# Patient Record
Sex: FEMALE | Race: ASIAN | Employment: UNEMPLOYED | URBAN - METROPOLITAN AREA
[De-identification: names, ages, dates, MRNs, and addresses within clinical notes are randomized per-mention and may not be internally consistent; named-entity substitution may affect disease eponyms.]

---

## 2017-01-13 ENCOUNTER — HOSPITAL ENCOUNTER (OUTPATIENT)
Dept: MAMMOGRAPHY | Facility: HOSPITAL | Age: 37
Discharge: HOME | End: 2017-01-13
Attending: INTERNAL MEDICINE
Payer: COMMERCIAL

## 2017-01-13 DIAGNOSIS — Z12.31 SCREENING MAMMOGRAM, ENCOUNTER FOR: ICD-10-CM

## 2017-01-13 PROCEDURE — 77067 SCR MAMMO BI INCL CAD: CPT

## 2017-01-20 ENCOUNTER — HOSPITAL ENCOUNTER (OUTPATIENT)
Dept: MAMMOGRAPHY | Facility: HOSPITAL | Age: 37
Discharge: HOME | End: 2017-01-20
Attending: INTERNAL MEDICINE
Payer: COMMERCIAL

## 2017-01-20 DIAGNOSIS — R92.2 INCONCLUSIVE MAMMOGRAM: ICD-10-CM

## 2017-01-20 PROCEDURE — 76642 ULTRASOUND BREAST LIMITED: CPT

## 2017-11-13 ENCOUNTER — OFFICE VISIT (OUTPATIENT)
Dept: FAMILY MEDICINE CLINIC | Facility: CLINIC | Age: 37
End: 2017-11-13

## 2017-11-13 VITALS
RESPIRATION RATE: 18 BRPM | SYSTOLIC BLOOD PRESSURE: 110 MMHG | WEIGHT: 178.81 LBS | BODY MASS INDEX: 31 KG/M2 | OXYGEN SATURATION: 97 % | TEMPERATURE: 99 F | HEART RATE: 96 BPM | DIASTOLIC BLOOD PRESSURE: 64 MMHG

## 2017-11-13 DIAGNOSIS — H10.33 ACUTE BACTERIAL CONJUNCTIVITIS OF BOTH EYES: ICD-10-CM

## 2017-11-13 DIAGNOSIS — J00 ACUTE NASOPHARYNGITIS: Primary | ICD-10-CM

## 2017-11-13 DIAGNOSIS — J02.9 SORE THROAT: ICD-10-CM

## 2017-11-13 PROCEDURE — 99202 OFFICE O/P NEW SF 15 MIN: CPT | Performed by: FAMILY MEDICINE

## 2017-11-13 PROCEDURE — 87081 CULTURE SCREEN ONLY: CPT | Performed by: FAMILY MEDICINE

## 2017-11-13 PROCEDURE — 87880 STREP A ASSAY W/OPTIC: CPT | Performed by: FAMILY MEDICINE

## 2017-11-13 RX ORDER — PREDNISONE 1 MG/1
1 TABLET ORAL
COMMUNITY
End: 2017-11-30 | Stop reason: ALTCHOICE

## 2017-11-13 RX ORDER — OFLOXACIN 3 MG/ML
SOLUTION/ DROPS OPHTHALMIC
Qty: 10 ML | Refills: 0 | Status: SHIPPED | OUTPATIENT
Start: 2017-11-13 | End: 2017-11-30 | Stop reason: ALTCHOICE

## 2017-11-13 RX ORDER — FLUTICASONE PROPIONATE 50 MCG
2 SPRAY, SUSPENSION (ML) NASAL DAILY
Qty: 1 BOTTLE | Refills: 1 | Status: SHIPPED | OUTPATIENT
Start: 2017-11-13 | End: 2018-01-11

## 2017-11-13 NOTE — PATIENT INSTRUCTIONS
· if you break out in a rash, return to the [facility] immediately for reeval, call your ophthamologist or go to ER, you may have developed shingles, which can cause blindness if left untreated. · Apply warm compresses twice daily.    ·  F Colds are caused by viruses. They can't be cured with antibiotics. However, you can ease symptoms and support your body's efforts to heal itself.   No matter which symptoms you have, be sure to:  · Drink plenty of fluids (water or clear soup)  · Stop smokin When you first notice symptoms, ask your healthcare provider if antiviral medicines are appropriate. Antibiotics should not be taken for colds or flu.  Also, call your healthcare provider if you have any of the following symptoms or if you aren't feeling be The tonsils and pharynx can become inflamed due to a cold or flu virus. Postnasal drip (excess mucus draining from the nasal cavity) can irritate the throat. It can also make the throat or tonsils more likely to be infected by bacteria.  Severe, untreated t Treatment depends on many factors. What is the likely cause? Is the problem recent? Does it keep coming back? In many cases, the best thing to do is to treat the symptoms, rest, and let the problem heal itself.  Antibiotics may help clear up some bacterial In some cases, tonsils need to be removed. This is often done as outpatient (same-day) surgery. Your healthcare provider may advise removing the tonsils in cases of:  · Several severe bouts of tonsillitis in a year.  “Severe” episodes include those that geovanna · Apply a warm compress (towel soaked in warm water) to the affected eye 3 to 4 times a day. Do this just before applying medicine to the eye. · Use a warm, wet cloth to wipe away crusting of the eyelids in the morning.  This is caused by mucus drainage du

## 2017-11-13 NOTE — PROGRESS NOTES
CHIEF COMPLAINT: Patient presents with:  Sore Throat: x5 days    HPI:     Susan Esteves is a 43year old female presents for evaluation of sore throat ×5 days with associated symptoms of nasal congestion and dry cough and postnasal drip.   Ivon Mcnamara the HPI.     PHYSICAL EXAM:   /64 (BP Location: Right arm, Patient Position: Sitting, Cuff Size: adult)   Pulse 96   Temp 98.6 °F (37 °C) (Oral)   Resp 18   Wt 178 lb 12.8 oz   LMP 10/29/2017 (Exact Date)   SpO2 97%   BMI 30.69 kg/m²   Vital signs rev typically  Finish eyedrops her symptoms will return  If breaks out in rash around eyes and must be evaluated immediately i.e. concern is herpes zoster  No visual acuity and does not wear glasses  No eye makeup and throat old eye makeup.   Detailed instructi

## 2017-11-16 ENCOUNTER — TELEPHONE (OUTPATIENT)
Dept: FAMILY MEDICINE CLINIC | Facility: CLINIC | Age: 37
End: 2017-11-16

## 2017-11-16 NOTE — TELEPHONE ENCOUNTER
Spoke with pt, reviewed results and recommendations, pt has continued to improve. P will follow up if symptoms worsen or fail to improve.     No Beta Hemolytic Strep Isolated

## 2017-11-30 ENCOUNTER — OFFICE VISIT (OUTPATIENT)
Dept: FAMILY MEDICINE CLINIC | Facility: CLINIC | Age: 37
End: 2017-11-30

## 2017-11-30 VITALS
TEMPERATURE: 98 F | OXYGEN SATURATION: 100 % | RESPIRATION RATE: 18 BRPM | SYSTOLIC BLOOD PRESSURE: 102 MMHG | DIASTOLIC BLOOD PRESSURE: 60 MMHG | HEART RATE: 106 BPM | WEIGHT: 181 LBS | BODY MASS INDEX: 31 KG/M2

## 2017-11-30 DIAGNOSIS — J01.90 ACUTE SINUSITIS WITH SYMPTOMS GREATER THAN 10 DAYS: Primary | ICD-10-CM

## 2017-11-30 DIAGNOSIS — J20.9 ACUTE BRONCHITIS, UNSPECIFIED ORGANISM: ICD-10-CM

## 2017-11-30 PROCEDURE — 99213 OFFICE O/P EST LOW 20 MIN: CPT | Performed by: NURSE PRACTITIONER

## 2017-11-30 RX ORDER — DOXYCYCLINE HYCLATE 100 MG/1
100 CAPSULE ORAL 2 TIMES DAILY
Qty: 20 CAPSULE | Refills: 0 | Status: SHIPPED | OUTPATIENT
Start: 2017-11-30 | End: 2018-01-11

## 2017-11-30 RX ORDER — CODEINE PHOSPHATE AND GUAIFENESIN 10; 100 MG/5ML; MG/5ML
SOLUTION ORAL
Qty: 180 ML | Refills: 0 | Status: SHIPPED | OUTPATIENT
Start: 2017-11-30 | End: 2018-01-11

## 2018-01-11 ENCOUNTER — OFFICE VISIT (OUTPATIENT)
Dept: FAMILY MEDICINE CLINIC | Facility: CLINIC | Age: 38
End: 2018-01-11

## 2018-01-11 ENCOUNTER — TELEPHONE (OUTPATIENT)
Dept: FAMILY MEDICINE CLINIC | Facility: CLINIC | Age: 38
End: 2018-01-11

## 2018-01-11 VITALS
WEIGHT: 177.63 LBS | RESPIRATION RATE: 18 BRPM | TEMPERATURE: 98 F | SYSTOLIC BLOOD PRESSURE: 94 MMHG | HEART RATE: 76 BPM | DIASTOLIC BLOOD PRESSURE: 60 MMHG | BODY MASS INDEX: 31.08 KG/M2 | OXYGEN SATURATION: 99 % | HEIGHT: 63.25 IN

## 2018-01-11 DIAGNOSIS — M19.042 PRIMARY OSTEOARTHRITIS OF BOTH HANDS: ICD-10-CM

## 2018-01-11 DIAGNOSIS — Z23 NEED FOR VACCINATION: ICD-10-CM

## 2018-01-11 DIAGNOSIS — Z00.00 ANNUAL PHYSICAL EXAM: Primary | ICD-10-CM

## 2018-01-11 DIAGNOSIS — Z13.0 SCREENING FOR ENDOCRINE, NUTRITIONAL, METABOLIC AND IMMUNITY DISORDER: ICD-10-CM

## 2018-01-11 DIAGNOSIS — Z13.0 SCREENING FOR IRON DEFICIENCY ANEMIA: ICD-10-CM

## 2018-01-11 DIAGNOSIS — M19.041 PRIMARY OSTEOARTHRITIS OF BOTH HANDS: ICD-10-CM

## 2018-01-11 DIAGNOSIS — Z13.21 SCREENING FOR ENDOCRINE, NUTRITIONAL, METABOLIC AND IMMUNITY DISORDER: ICD-10-CM

## 2018-01-11 DIAGNOSIS — Z13.29 SCREENING FOR ENDOCRINE, NUTRITIONAL, METABOLIC AND IMMUNITY DISORDER: ICD-10-CM

## 2018-01-11 DIAGNOSIS — Z13.6 SCREENING FOR HEART DISEASE: ICD-10-CM

## 2018-01-11 DIAGNOSIS — Z13.228 SCREENING FOR ENDOCRINE, NUTRITIONAL, METABOLIC AND IMMUNITY DISORDER: ICD-10-CM

## 2018-01-11 LAB
25-HYDROXYVITAMIN D (TOTAL): 12 NG/ML (ref 30–100)
ALBUMIN SERPL-MCNC: 3.8 G/DL (ref 3.5–4.8)
ALP LIVER SERPL-CCNC: 54 U/L (ref 37–98)
ALT SERPL-CCNC: 43 U/L (ref 14–54)
AST SERPL-CCNC: 24 U/L (ref 15–41)
BASOPHILS # BLD AUTO: 0.03 X10(3) UL (ref 0–0.1)
BASOPHILS NFR BLD AUTO: 0.4 %
BILIRUB SERPL-MCNC: 0.6 MG/DL (ref 0.1–2)
BUN BLD-MCNC: 9 MG/DL (ref 8–20)
CALCIUM BLD-MCNC: 9.2 MG/DL (ref 8.3–10.3)
CHLORIDE: 105 MMOL/L (ref 101–111)
CHOLEST SMN-MCNC: 195 MG/DL (ref ?–200)
CO2: 28 MMOL/L (ref 22–32)
CREAT BLD-MCNC: 0.76 MG/DL (ref 0.55–1.02)
EOSINOPHIL # BLD AUTO: 0.12 X10(3) UL (ref 0–0.3)
EOSINOPHIL NFR BLD AUTO: 1.6 %
ERYTHROCYTE [DISTWIDTH] IN BLOOD BY AUTOMATED COUNT: 13.2 % (ref 11.5–16)
GLUCOSE BLD-MCNC: 76 MG/DL (ref 70–99)
HCT VFR BLD AUTO: 39.4 % (ref 34–50)
HDLC SERPL-MCNC: 45 MG/DL (ref 45–?)
HDLC SERPL: 4.33 {RATIO} (ref ?–4.44)
HGB BLD-MCNC: 12.6 G/DL (ref 12–16)
IMMATURE GRANULOCYTE COUNT: 0.02 X10(3) UL (ref 0–1)
IMMATURE GRANULOCYTE RATIO %: 0.3 %
LDLC SERPL CALC-MCNC: 132 MG/DL (ref ?–130)
LYMPHOCYTES # BLD AUTO: 2.66 X10(3) UL (ref 0.9–4)
LYMPHOCYTES NFR BLD AUTO: 36.4 %
M PROTEIN MFR SERPL ELPH: 7.8 G/DL (ref 6.1–8.3)
MCH RBC QN AUTO: 28.5 PG (ref 27–33.2)
MCHC RBC AUTO-ENTMCNC: 32 G/DL (ref 31–37)
MCV RBC AUTO: 89.1 FL (ref 81–100)
MONOCYTES # BLD AUTO: 0.56 X10(3) UL (ref 0.1–0.6)
MONOCYTES NFR BLD AUTO: 7.7 %
NEUTROPHIL ABS PRELIM: 3.92 X10 (3) UL (ref 1.3–6.7)
NEUTROPHILS # BLD AUTO: 3.92 X10(3) UL (ref 1.3–6.7)
NEUTROPHILS NFR BLD AUTO: 53.6 %
NONHDLC SERPL-MCNC: 150 MG/DL (ref ?–130)
PLATELET # BLD AUTO: 273 10(3)UL (ref 150–450)
POTASSIUM SERPL-SCNC: 4 MMOL/L (ref 3.6–5.1)
RBC # BLD AUTO: 4.42 X10(6)UL (ref 3.8–5.1)
RED CELL DISTRIBUTION WIDTH-SD: 42.7 FL (ref 35.1–46.3)
RUBELLA IGG QUANTITATIVE: 56.9 IU/ML (ref 10–?)
RUBV IGG SER QL: POSITIVE
SODIUM SERPL-SCNC: 137 MMOL/L (ref 136–144)
TRIGL SERPL-MCNC: 92 MG/DL (ref ?–150)
TSI SER-ACNC: 2.44 MIU/ML (ref 0.35–5.5)
VLDLC SERPL CALC-MCNC: 18 MG/DL (ref 5–40)
WBC # BLD AUTO: 7.3 X10(3) UL (ref 4–13)

## 2018-01-11 PROCEDURE — 86735 MUMPS ANTIBODY: CPT | Performed by: FAMILY MEDICINE

## 2018-01-11 PROCEDURE — 90471 IMMUNIZATION ADMIN: CPT | Performed by: FAMILY MEDICINE

## 2018-01-11 PROCEDURE — 99395 PREV VISIT EST AGE 18-39: CPT | Performed by: FAMILY MEDICINE

## 2018-01-11 PROCEDURE — 86765 RUBEOLA ANTIBODY: CPT | Performed by: FAMILY MEDICINE

## 2018-01-11 PROCEDURE — 82306 VITAMIN D 25 HYDROXY: CPT | Performed by: FAMILY MEDICINE

## 2018-01-11 PROCEDURE — 80050 GENERAL HEALTH PANEL: CPT | Performed by: FAMILY MEDICINE

## 2018-01-11 PROCEDURE — 80061 LIPID PANEL: CPT | Performed by: FAMILY MEDICINE

## 2018-01-11 PROCEDURE — 86787 VARICELLA-ZOSTER ANTIBODY: CPT | Performed by: FAMILY MEDICINE

## 2018-01-11 PROCEDURE — 36415 COLL VENOUS BLD VENIPUNCTURE: CPT | Performed by: FAMILY MEDICINE

## 2018-01-11 PROCEDURE — 86762 RUBELLA ANTIBODY: CPT | Performed by: FAMILY MEDICINE

## 2018-01-11 PROCEDURE — 90715 TDAP VACCINE 7 YRS/> IM: CPT | Performed by: FAMILY MEDICINE

## 2018-01-11 NOTE — PROGRESS NOTES
Patient came in for fasting labs. Patient drawn out of right AC x 1 attempt. Gold, light green, and lavender tubes drawn.

## 2018-01-11 NOTE — TELEPHONE ENCOUNTER
Patient signed medical records authorization form for the below Facility to disclose health information to [facility]:     Facility / Provider Name: Dr. Amparo Orozco Address:  Facility Phone: [address] Fax: [address] [facility]

## 2018-01-11 NOTE — PATIENT INSTRUCTIONS
Perform labs fasting 8 hours with water or black coffee or or black tea diet  soda only prior to exam.    -Encourage healthy diet of whole food and avoid processed food and sugary drinks and sodas.   Diet should include lean meats and vegetables including 5 Vitamin D comes in several forms. When ultraviolet light, such as sunlight, hits your skin, it creates vitamin D3. D2 is used to fortify dairy foods. Both of these are further processed by your liver and kidneys into a form your body can use.  Most tests fo Children and adults need more than 30 nanograms per milliliter (ng/ml) of vitamin D. The optimal level of 25(OH)D is usually between 30 and 60 ng/mL. Recommended daily amounts range from 400 to 800 international units (IU) per day based on your age.   Level Your body needs calcium to build and repair bones. But it can't make calcium on its own. That's why it's important to eat calcium-rich foods. Some foods are naturally rich in calcium. Others have calcium added (fortified).  It's best to get calcium from the Osteoporosis is a disease that weakens the bones. Weakened bones are more likely to fracture (break). Osteoporosis affects men and women, but postmenopausal women are most at risk.  To help prevent osteoporosis, you need to exercise and nourish your bones t ©2007 [employer] [address] [facility], [address]. All Rights reserved. This information is not intended as a substitute for professional medical care. Always follow your healthcare provider’s instructions.         Preventing Osteop If you have osteoporosis, exercise is vital for your health. It can prevent bone fractures and spine changes. It will slow bone loss. Exercise will strengthen your body. It can also be fun.  A variety of exercises is best. See below for exercises that can h

## 2018-01-11 NOTE — PROGRESS NOTES
REASON FOR VISIT:    Senait Paiz is a 42year old female who presents for an [address]. Had workup with rheumatologist, Byron Nolasco MD, per patient and diagnosed with osteoarthritis of the hands and feet.   Does not take med No results found for: TSH    Lab Results  Component Value Date   BUN 15 11/23/2011   CREATSERUM 1.0 11/23/2011       General Health     How would you describe your current health state?: Good    Type of Diet: Vegetarian    How do you maintain positive ment Cholesterol Screening Recommended screening varies with age, risk and gender No results found for: LDL, LDLC    Diabetes Screening  if history of high blood pressure or other  risk factors No results found for: A1C  GLUCOSE (mg/dL)   Date Value   11/23/201 rheumatory   • Patient denies medical problems    • Tubal pregnancy       Past Surgical History:  No date: TUBAL LIGATION   Family History   Problem Relation Age of Onset   • No Known Problems Mother    • No Known Problems Father    • Hypertension Materna LUNGS: clear to auscultation  CARDIO: RRR without murmur  GI: good BS's, no masses, HSM or tenderness  : deferred- has appt with Gyn next week  RECTAL: declined  MUSCULOSKELETAL: back is not tender, FROM of the back  EXTREMITIES: no cyanosis, clubbing or -- TETANUS, DIPHTHERIA TOXOIDS AND ACELLULAR PERTUSIS VACCINE (TDAP), >7 YEARS, IM USE  - INFLUENZA VIRUS VACCINE, QUAD, PRESERVATIVE FREE, 0.5 ML    6. Primary osteoarthritis of both hands  Does not take any medication medications for pain.   No joint defo

## 2018-01-12 LAB
MEV IGG SER IA-ACNC: POSITIVE
MUV IGG SER IA-ACNC: POSITIVE
VZV IGG SER IA-ACNC: NEGATIVE

## 2018-01-17 ENCOUNTER — TELEPHONE (OUTPATIENT)
Dept: FAMILY MEDICINE CLINIC | Facility: CLINIC | Age: 38
End: 2018-01-17

## 2018-01-17 ENCOUNTER — OFFICE VISIT (OUTPATIENT)
Dept: FAMILY MEDICINE CLINIC | Facility: CLINIC | Age: 38
End: 2018-01-17

## 2018-01-17 VITALS
TEMPERATURE: 102 F | HEART RATE: 112 BPM | SYSTOLIC BLOOD PRESSURE: 100 MMHG | OXYGEN SATURATION: 99 % | DIASTOLIC BLOOD PRESSURE: 70 MMHG | RESPIRATION RATE: 16 BRPM

## 2018-01-17 DIAGNOSIS — J11.1 INFLUENZA: Primary | ICD-10-CM

## 2018-01-17 PROCEDURE — 99213 OFFICE O/P EST LOW 20 MIN: CPT | Performed by: PHYSICIAN ASSISTANT

## 2018-01-17 RX ORDER — BENZONATATE 200 MG/1
200 CAPSULE ORAL 3 TIMES DAILY PRN
Qty: 30 CAPSULE | Refills: 0 | Status: SHIPPED | OUTPATIENT
Start: 2018-01-17 | End: 2018-02-12 | Stop reason: ALTCHOICE

## 2018-01-17 RX ORDER — FLUTICASONE PROPIONATE 50 MCG
2 SPRAY, SUSPENSION (ML) NASAL DAILY
Qty: 1 INHALER | Refills: 0 | Status: SHIPPED | OUTPATIENT
Start: 2018-01-17 | End: 2018-02-12 | Stop reason: ALTCHOICE

## 2018-01-17 RX ORDER — OSELTAMIVIR PHOSPHATE 75 MG/1
75 CAPSULE ORAL 2 TIMES DAILY
Qty: 10 CAPSULE | Refills: 0 | Status: SHIPPED | OUTPATIENT
Start: 2018-01-17 | End: 2018-02-12 | Stop reason: ALTCHOICE

## 2018-01-17 NOTE — TELEPHONE ENCOUNTER
SPoke with pt, appointment scheduled    Future Appointments  Date Time Provider Peg Hagen   2/12/2018 6:40 PM Carlos Duckworth, DO [facility] [place]

## 2018-01-17 NOTE — PROGRESS NOTES
CHIEF COMPLAINT:   Patient presents with:  URI: cough, sore throat.  fever 103      HPI:      Helene Weiss is a 42year old female who presents complaining of flu-like symptoms of fever to 103, malaise, fatigue, chills, myalgias, congestion, sore palpation of  sinuses  EYES: sclera non icteric, conjunctiva clear  EARS: TM's non erythematous  NOSE: Nasal mucosa congested, clear nasal discharge   THROAT: Posterior pharynx is mildy erythematous, moderate PND, no exudates.      NECK: Supple, non-tender,

## 2018-01-17 NOTE — TELEPHONE ENCOUNTER
LMOM to return call to the office. Provided pt office phone [phone] along with office hours given.     Notes Recorded by Bryon Malin DO on 1/15/2018 at 10:06 PM CST  Low vitamin D and low lipid panel and not immune to varicella.  Needs vaccinatio

## 2018-02-12 ENCOUNTER — OFFICE VISIT (OUTPATIENT)
Dept: FAMILY MEDICINE CLINIC | Facility: CLINIC | Age: 38
End: 2018-02-12

## 2018-02-12 VITALS
SYSTOLIC BLOOD PRESSURE: 108 MMHG | HEART RATE: 85 BPM | RESPIRATION RATE: 18 BRPM | DIASTOLIC BLOOD PRESSURE: 68 MMHG | OXYGEN SATURATION: 98 % | TEMPERATURE: 98 F

## 2018-02-12 DIAGNOSIS — E78.5 DYSLIPIDEMIA (HIGH LDL; LOW HDL): ICD-10-CM

## 2018-02-12 DIAGNOSIS — E55.9 VITAMIN D DEFICIENCY: ICD-10-CM

## 2018-02-12 DIAGNOSIS — Z23 NEED FOR VACCINATION: Primary | ICD-10-CM

## 2018-02-12 PROCEDURE — 90716 VAR VACCINE LIVE SUBQ: CPT | Performed by: FAMILY MEDICINE

## 2018-02-12 PROCEDURE — 90471 IMMUNIZATION ADMIN: CPT | Performed by: FAMILY MEDICINE

## 2018-02-12 PROCEDURE — 99214 OFFICE O/P EST MOD 30 MIN: CPT | Performed by: FAMILY MEDICINE

## 2018-02-12 RX ORDER — ERGOCALCIFEROL 1.25 MG/1
CAPSULE ORAL
Refills: 0 | COMMUNITY
Start: 2018-01-31 | End: 2019-07-10 | Stop reason: ALTCHOICE

## 2018-02-12 RX ORDER — CEPHALEXIN 500 MG/1
CAPSULE ORAL
Refills: 0 | COMMUNITY
Start: 2018-01-31 | End: 2018-02-12 | Stop reason: ALTCHOICE

## 2018-02-12 RX ORDER — BETAMETHASONE DIPROPIONATE 0.05 %
OINTMENT (GRAM) TOPICAL
Refills: 6 | COMMUNITY
Start: 2018-01-31 | End: 2019-07-10 | Stop reason: ALTCHOICE

## 2018-02-13 NOTE — PATIENT INSTRUCTIONS
Buy over-the-counter vitamin D3  2000 IUs take  1 tab daily after Rx 50,000.      · Increase omega 3's and alpha linoleic acid (ALA) in diet salmon 2x weekly, walnuts, almonds,flax seed, fatty fish, spinach, blake seeds, grass feed dairy-milk,eggs, marianne l · Having difficulty absorbing fat from your diet  · Having chronic kidney disease  · Have dark skin pigmentation  · Being a  baby  Vitamin D has many effects in the body.  You may need this test to help your provider diagnose or treat:  · Problems What might affect my test results? The amount of time you spend in the sunlight, your diet, and whether you take vitamin D in supplement form can affect your vitamin D levels.  Ask your healthcare provider if any health conditions you have or medicines you Talk to your pediatrician regarding the use of the varicella virus vaccine in children. While this drug may be prescribed for children as young as 13 months of age for selected conditions, precautions do apply.  The herpes zoster virus vaccine is not approv Keep appointments for follow-up (booster) doses of varicella virus vaccine as directed. It is important not to miss your dose. Call your doctor or health care professional if you are unable to keep an appointment.   Follow-up (booster) doses are not needed

## 2018-02-13 NOTE — PROGRESS NOTES
CHIEF COMPLAINT: Patient presents with: Follow - Up: labs    HPI:     Geoffrey Carvalho is a 42year old female presents for followup labs. Started Rx vit D 50,000 IU weekly by derm for eczema x 2 weeks - admits fatigue.  Eczema improving with vit D s reviewed. Appears stated age, well groomed. Physical Exam  GENERAL: well developed, well nourished,in no apparent distress  EYES; PERRLA, EOM-i B/L.  Sclera clear and non icteric bilateral  SKIN: no rashes,no suspicious lesions  HEENT: atraumatic, normoceph 01/11/2018 3.92    • Lymphocyte Absolute 01/11/2018 2.66    • Monocyte Absolute 01/11/2018 0.56    • Eosinophil Absolute 01/11/2018 0.12    • Basophil Absolute 01/11/2018 0.03    • Immature Granulocyte Abs* 01/11/2018 0.02    • Neutrophil % 01/11/2018 53. 6 Education: There are no barriers to learning. Medical education done. Outcome: Patient verbalizes understanding. Patient is notified to call with any questions, comp lications, allergies, or worsening or changing symptoms.   Patient is to call with any si

## 2018-02-23 ENCOUNTER — TELEPHONE (OUTPATIENT)
Dept: FAMILY MEDICINE CLINIC | Facility: CLINIC | Age: 38
End: 2018-02-23

## 2018-02-23 NOTE — TELEPHONE ENCOUNTER
Spoke to patient, patient states she was seen at [facility] in December was told she can be on 5 year pap plan.

## 2018-02-23 NOTE — TELEPHONE ENCOUNTER
Received medical records, last pap was 2015. Needs repeat as of 1/2018. lmom for patient. No future appointments.

## 2018-06-22 PROCEDURE — 83001 ASSAY OF GONADOTROPIN (FSH): CPT | Performed by: INTERNAL MEDICINE

## 2018-06-22 PROCEDURE — 83002 ASSAY OF GONADOTROPIN (LH): CPT | Performed by: INTERNAL MEDICINE

## 2018-06-22 PROCEDURE — 36415 COLL VENOUS BLD VENIPUNCTURE: CPT | Performed by: INTERNAL MEDICINE

## 2018-07-27 PROBLEM — H10.33 ACUTE BACTERIAL CONJUNCTIVITIS OF BOTH EYES: Status: RESOLVED | Noted: 2017-11-13 | Resolved: 2018-07-27

## 2018-07-27 PROBLEM — J00 ACUTE NASOPHARYNGITIS: Status: RESOLVED | Noted: 2017-11-13 | Resolved: 2018-07-27

## 2018-10-12 ENCOUNTER — OFFICE VISIT (OUTPATIENT)
Dept: FAMILY MEDICINE CLINIC | Facility: CLINIC | Age: 38
End: 2018-10-12
Payer: COMMERCIAL

## 2018-10-12 VITALS
TEMPERATURE: 98 F | OXYGEN SATURATION: 99 % | WEIGHT: 176 LBS | RESPIRATION RATE: 18 BRPM | HEART RATE: 98 BPM | SYSTOLIC BLOOD PRESSURE: 110 MMHG | BODY MASS INDEX: 30 KG/M2 | DIASTOLIC BLOOD PRESSURE: 64 MMHG

## 2018-10-12 DIAGNOSIS — M54.12 LEFT CERVICAL RADICULOPATHY: ICD-10-CM

## 2018-10-12 DIAGNOSIS — R00.2 PALPITATIONS: ICD-10-CM

## 2018-10-12 DIAGNOSIS — M54.2 CERVICALGIA: Primary | ICD-10-CM

## 2018-10-12 PROCEDURE — 99214 OFFICE O/P EST MOD 30 MIN: CPT | Performed by: FAMILY MEDICINE

## 2018-10-12 RX ORDER — IBUPROFEN 800 MG/1
800 TABLET ORAL 3 TIMES DAILY
Qty: 21 TABLET | Refills: 0 | Status: SHIPPED | OUTPATIENT
Start: 2018-10-12 | End: 2018-10-18

## 2018-10-12 RX ORDER — CYCLOBENZAPRINE HCL 10 MG
10 TABLET ORAL
COMMUNITY
Start: 2018-10-08 | End: 2018-10-12

## 2018-10-12 RX ORDER — IBUPROFEN 200 MG
200 TABLET ORAL
COMMUNITY
End: 2018-10-24 | Stop reason: ALTCHOICE

## 2018-10-12 RX ORDER — PREDNISONE 50 MG/1
50 TABLET ORAL
COMMUNITY
Start: 2018-10-08 | End: 2018-10-13

## 2018-10-12 RX ORDER — CHLORZOXAZONE 500 MG/1
500 TABLET ORAL NIGHTLY PRN
Qty: 30 TABLET | Refills: 0 | Status: SHIPPED | OUTPATIENT
Start: 2018-10-12 | End: 2019-07-10 | Stop reason: ALTCHOICE

## 2018-10-12 NOTE — PATIENT INSTRUCTIONS
Understanding Cervical Radiculopathy    Cervical radiculopathy is irritation or inflammation of a nerve root in the neck. It causes neck pain and other symptoms that may spread into the chest or down the arm.  To understand this condition, it helps to und In most cases, your healthcare provider will first try treatments that help relieve symptoms. These may include:  · Prescription or over-the-counter pain medicines. These help relieve pain and swelling. · Cold packs. These help reduce pain. · Resting.  Yunior Hargrove · Coronary artery disease  · High blood pressure  Non-heart-related causes:  · Certain medicines such as asthma inhalers and decongestants  · Some herbal supplements, energy drinks and pills, and weight loss pills  · Illegal stimulant drugs such as cocaine

## 2018-10-12 NOTE — PROGRESS NOTES
CHIEF COMPLAINT: Patient presents with:  Neck Pain: disc spur C5-C7  Shoulder Pain    HPI:     Carlos Landeros is a 42year old female presents for discuss left-sided neck pain from base of mid neck radiates into her upper arm symptoms worsening ove Social History: Social History    Tobacco Use      Smoking status: Never Smoker      Smokeless tobacco: Never Used    Alcohol use:  Yes      Alcohol/week: 1.2 oz      Types: 2 Standard drinks or equivalent per week    Drug use: No       Medications (Active Neck: supple. No adenopathy. No thyromegaly. Tenderness C2 through C6 midline extending over to the left paravertebral musculature with moderate spasm.   Normal rotation of the C-spine right and left almost 80° and full extensionand able to perform chin to Seven cervical vertebral bodies are seen. The cervical spine demonstrates normal curvature and  alignment. The vertebral body heights are preserved. There is no prevertebral soft tissue swelling. There is mild endplate spurring at O5-Y8 and C6-C7.  The ne Sig: Take 1 tablet (500 mg total) by mouth nightly as needed for Muscle spasms (No driving or operating machinery for 8 hours after ingestion due to sedation. Avoid alcohol).        Health Maintenance:  Annual Depression Screen due on 01/11/2019  Influenza

## 2018-10-17 ENCOUNTER — HOSPITAL ENCOUNTER (OUTPATIENT)
Dept: MRI IMAGING | Age: 38
Discharge: HOME | End: 2018-10-17
Attending: FAMILY MEDICINE
Payer: COMMERCIAL

## 2018-10-17 DIAGNOSIS — M54.12 LEFT CERVICAL RADICULOPATHY: ICD-10-CM

## 2018-10-17 PROCEDURE — 72141 MRI NECK SPINE W/O DYE: CPT | Performed by: FAMILY MEDICINE

## 2018-10-17 NOTE — PROGRESS NOTES
Please call pt to inform of results: MRI is showing that she has disc disease with protrusion at 2 levels of her neck spine: at C5-C6 and C6-C7. She was given a referral to see Neurosurgery (Dr. Ravindra Salas) at her 10/12 visit with Dr. Jewell Coles.  Please schedul

## 2018-10-18 ENCOUNTER — TELEPHONE (OUTPATIENT)
Dept: FAMILY MEDICINE CLINIC | Facility: CLINIC | Age: 38
End: 2018-10-18

## 2018-10-18 NOTE — TELEPHONE ENCOUNTER
Informed pt of results and recommendations. PT has appointment with neurosurgery on 11/5/18. PT states chlorzoxaxone helps relief with muscle spasms/pain at night but ibuprofen has had none to minimal relief during the day.  Has follow-up with Dr. Bautista Diaz

## 2018-10-18 NOTE — TELEPHONE ENCOUNTER
Spoke with pt informed her of Dr. Yari Morgan recommendations. PT verbalized understanding. States she will continue to take chlorozoxazone and start naproxen, and will stop the ibuprofen as instructed.  Also will use warm compresses    Future Appointments

## 2018-10-18 NOTE — TELEPHONE ENCOUNTER
LMOM to return call to the office. Provided pt office phone [phone] along with office hours given.     Notes recorded by Kerwin Perkins MD on 10/17/2018 at 4:08 PM CDT  Please call pt to inform of results: MRI is showing that she has disc d

## 2018-10-19 ENCOUNTER — NURSE ONLY (OUTPATIENT)
Dept: FAMILY MEDICINE CLINIC | Facility: CLINIC | Age: 38
End: 2018-10-19

## 2018-10-19 DIAGNOSIS — R00.2 PALPITATIONS: ICD-10-CM

## 2018-10-19 PROCEDURE — 36415 COLL VENOUS BLD VENIPUNCTURE: CPT | Performed by: FAMILY MEDICINE

## 2018-10-19 PROCEDURE — 85025 COMPLETE CBC W/AUTO DIFF WBC: CPT | Performed by: FAMILY MEDICINE

## 2018-10-19 PROCEDURE — 80053 COMPREHEN METABOLIC PANEL: CPT | Performed by: FAMILY MEDICINE

## 2018-10-19 PROCEDURE — 84443 ASSAY THYROID STIM HORMONE: CPT | Performed by: FAMILY MEDICINE

## 2018-10-19 NOTE — PROGRESS NOTES
Patient came in for fasting labs. Patient drawn out of right AC x 1 attempt. Green and lavender tube drawn.

## 2018-10-20 NOTE — PROGRESS NOTES
Please call pt to inform that her labs are normal. Please schedule her ECHO if not done already. She has an upcoming appt on 10/24 with Dr. Arline Avalos.

## 2018-10-24 ENCOUNTER — OFFICE VISIT (OUTPATIENT)
Dept: FAMILY MEDICINE CLINIC | Facility: CLINIC | Age: 38
End: 2018-10-24
Payer: COMMERCIAL

## 2018-10-24 VITALS
RESPIRATION RATE: 18 BRPM | OXYGEN SATURATION: 98 % | WEIGHT: 175.63 LBS | BODY MASS INDEX: 30 KG/M2 | SYSTOLIC BLOOD PRESSURE: 114 MMHG | HEART RATE: 96 BPM | TEMPERATURE: 99 F | DIASTOLIC BLOOD PRESSURE: 70 MMHG

## 2018-10-24 DIAGNOSIS — M54.12 LEFT CERVICAL RADICULOPATHY: ICD-10-CM

## 2018-10-24 DIAGNOSIS — M54.2 CERVICALGIA: Primary | ICD-10-CM

## 2018-10-24 PROCEDURE — 99213 OFFICE O/P EST LOW 20 MIN: CPT | Performed by: FAMILY MEDICINE

## 2018-10-24 NOTE — PROGRESS NOTES
CHIEF COMPLAINT: Patient presents with: Follow - Up: discuss labs, MRI    HPI:     Geoffrey Carvalho is a 42year old female presents for follow-up  MRI results.   Left-sided neck pain radiates from the base of the mid neck into the upper arm continue Complains of palpitations lasting 10-15 minutes with heart rate up to 100-notices mostly when lying down at night. States started prior to her starting prednisone.   Had EKG completed at [facility] urgent [facility] on 10/8/2018 that was normal sinus rhyth PSFH elements reviewed from today and agreed except as otherwise stated in HPI.  ROS:     Review of Systems  Positive for stated complaint: Cervical neck pain with radiculopathy, palpitations     Pertinent positives and negatives noted in the the HPI.     P Neuro: AOx3.cranial nerves II through XII grossly intact. Pupils are equal round and reactive to light extraocular muscles intact bilaterally NL gait. Normal u/LE bilateral DTR +2/4 and symmetric. Normal sensation. Normal finger to nose test. Normal muscle PROCEDURE: MRI SPINE CERVICAL (CPT=72141)  COMPARISON: None. INDICATIONS: LT sided neck pain radiating down into shoulder and arm x's 2 weeks. Numbness in fingers. No known injury.   TECHNIQUE: A variety of imaging planes and parameters were utilized for v CONCLUSION:  1. No acute cervical spine fracture or malalignment. Normal caliber and signal characteristics of the cervical spinal cord. 2. C5-C6: Mild spinal canal and mild left neural foraminal stenosis related to a left paracentral disc protrusion.  3. C Problem List:   Patient Active Problem List:     Primary osteoarthritis of both hands     Dyslipidemia (high LDL; low HDL)     Vitamin D deficiency     Palpitations     Left cervical radiculopathy     Cervicalgia      Imaging & Referrals:  None     10/12

## 2018-11-05 ENCOUNTER — OFFICE VISIT (OUTPATIENT)
Dept: SURGERY | Facility: CLINIC | Age: 38
End: 2018-11-05
Payer: COMMERCIAL

## 2018-11-05 VITALS
HEIGHT: 64 IN | HEART RATE: 96 BPM | BODY MASS INDEX: 28.51 KG/M2 | DIASTOLIC BLOOD PRESSURE: 78 MMHG | WEIGHT: 167 LBS | SYSTOLIC BLOOD PRESSURE: 120 MMHG

## 2018-11-05 DIAGNOSIS — M50.20 CERVICAL DISC HERNIATION: Primary | ICD-10-CM

## 2018-11-05 DIAGNOSIS — M54.12 CERVICAL RADICULOPATHY: ICD-10-CM

## 2018-11-05 PROCEDURE — 99203 OFFICE O/P NEW LOW 30 MIN: CPT | Performed by: NEUROLOGICAL SURGERY

## 2018-11-05 NOTE — PATIENT INSTRUCTIONS
Refill policies:    • Allow 2-3 business days for refills; controlled substances may take longer.   • Contact your pharmacy at least 5 days prior to running out of medication and have them send an electronic request or submit request through the “request re entire amount billed. Precertification and Prior Authorizations: If your physician has recommended that you have a procedure or additional testing performed.   [facility]) will contact your insurance carrier to obtain pre-certi

## 2018-11-05 NOTE — PROGRESS NOTES
Location of Pain:  Pt states that she has cervical pain. Pt states that she has left shoulder pain and radiating left arm pain. Pt states that she has weakness in the left hand. Pt states that she has numbness and tingling in the left arm.  Pt states that s

## 2018-11-05 NOTE — H&P
Neurosurgery Clinic Visit  2018    Marni Dillon PCP:  Jonathon Yañez DO    1980 MRN [id number]       CC:  Neck/Left Arm Pain    HPI:    Elvie Lopez is a very pleasant 42year old female who presents with recent left arm pain.   She has ha week      Drug use: No      Sexual activity: Not on file    Other Topics      Concerns:        Caffeine Concern: No          1 cups of coffee daily         Exercise: Yes          no regular regimen        Seat Belt: Yes        Special Diet: Not Asked Cervical disc herniation    2. Cervical radiculopathy      I, Hong Jacobs, am acting as a scribe. Dr. Catia Bhatia performed the interview and examination. Marni is experiencing left radicular arm pain likely from left C5-6 disc herniation.   She is feeling m

## 2018-12-26 ENCOUNTER — HOSPITAL ENCOUNTER (OUTPATIENT)
Dept: CV DIAGNOSTICS | Facility: HOSPITAL | Age: 38
Discharge: HOME | End: 2018-12-26
Attending: FAMILY MEDICINE
Payer: COMMERCIAL

## 2018-12-26 DIAGNOSIS — R00.2 PALPITATIONS: ICD-10-CM

## 2018-12-26 PROCEDURE — 93306 TTE W/DOPPLER COMPLETE: CPT | Performed by: FAMILY MEDICINE

## 2018-12-28 ENCOUNTER — TELEPHONE (OUTPATIENT)
Dept: FAMILY MEDICINE CLINIC | Facility: CLINIC | Age: 38
End: 2018-12-28

## 2018-12-28 NOTE — TELEPHONE ENCOUNTER
Informed pt of results and md recommendations. Pt scheduled appt.     Future Appointments   Date Time Provider Peg Hagen   1/21/2019  6:40 PM Elise Palacios DO [facility] [place]       ----- Message from Nieves Brown DO sent at 12/26/2018  2:41 PM C

## 2019-07-10 PROCEDURE — 87086 URINE CULTURE/COLONY COUNT: CPT | Performed by: INTERNAL MEDICINE

## 2019-07-29 ENCOUNTER — HOSPITAL ENCOUNTER (OUTPATIENT)
Dept: MAMMOGRAPHY | Facility: HOSPITAL | Age: 39
Discharge: HOME | End: 2019-07-29
Attending: OBSTETRICS & GYNECOLOGY
Payer: COMMERCIAL

## 2019-07-29 DIAGNOSIS — Z12.31 ENCOUNTER FOR SCREENING MAMMOGRAM FOR MALIGNANT NEOPLASM OF BREAST: ICD-10-CM

## 2019-07-29 PROCEDURE — 77063 BREAST TOMOSYNTHESIS BI: CPT | Performed by: OBSTETRICS & GYNECOLOGY

## 2019-07-29 PROCEDURE — 77067 SCR MAMMO BI INCL CAD: CPT | Performed by: OBSTETRICS & GYNECOLOGY

## 2019-08-30 ENCOUNTER — HOSPITAL ENCOUNTER (OUTPATIENT)
Dept: MAMMOGRAPHY | Facility: HOSPITAL | Age: 39
Discharge: HOME | End: 2019-08-30
Attending: OBSTETRICS & GYNECOLOGY
Payer: COMMERCIAL

## 2019-08-30 DIAGNOSIS — R92.2 DENSE BREAST: ICD-10-CM

## 2019-08-30 PROCEDURE — 76641 ULTRASOUND BREAST COMPLETE: CPT | Performed by: OBSTETRICS & GYNECOLOGY

## 2019-11-21 ENCOUNTER — OFFICE VISIT (OUTPATIENT)
Dept: FAMILY MEDICINE CLINIC | Facility: CLINIC | Age: 39
End: 2019-11-21

## 2019-11-21 VITALS
BODY MASS INDEX: 28.75 KG/M2 | OXYGEN SATURATION: 99 % | RESPIRATION RATE: 18 BRPM | SYSTOLIC BLOOD PRESSURE: 100 MMHG | TEMPERATURE: 98 F | WEIGHT: 168.38 LBS | DIASTOLIC BLOOD PRESSURE: 60 MMHG | HEIGHT: 64 IN | HEART RATE: 94 BPM

## 2019-11-21 DIAGNOSIS — H81.13 BENIGN PAROXYSMAL POSITIONAL VERTIGO DUE TO BILATERAL VESTIBULAR DISORDER: Primary | ICD-10-CM

## 2019-11-21 PROCEDURE — 99212 OFFICE O/P EST SF 10 MIN: CPT | Performed by: FAMILY MEDICINE

## 2019-11-21 RX ORDER — MECLIZINE HYDROCHLORIDE 25 MG/1
25 TABLET ORAL 3 TIMES DAILY PRN
COMMUNITY
End: 2020-01-23 | Stop reason: ALTCHOICE

## 2019-11-21 NOTE — PATIENT INSTRUCTIONS
As of October 6th 2014, the Drug Enforcement Agency [facility]) is reclassifying all hydrocodone combination medications from Schedule III to Schedule II. This includes medications such as Norco, Vicodin, Lortab, Zohydro, and Vicoprofen.      What this means for around you. This often causes dizziness, imbalance, nausea, vomiting and even blurred vision while walking, standing, sitting or lying down.  The [facility] explains that it's often a result of a condition known as benign paroxysmal positional vertigo, whi alternating from one side to the next. Stay focused on your finger. Allow the dizziness to subside and then repeat three more times.

## 2019-11-21 NOTE — PROGRESS NOTES
CHIEF COMPLAINT: Patient presents with:   Follow - Up: IC on 10-18-19    HPI:     Bindu Menard is a 37year old female presents for follow-up IC [facility] immediate care 3 days ago was diagnosed with an inner ear infection and given neomycin-poly LIGATION        Family History   Problem Relation Age of Onset   • Breast Cancer Mother 64   • No Known Problems Father    • Hypertension Maternal Grandmother    • Hypertension Maternal Grandfather    • No Known Problems Paternal Grandmother    • No Known light.  She has scant fluid in the left ear. EACs are clear bilateral.  Tragus is nontender. Oral pharynx clear without normal finding. Moist mucous membranes. Neck: supple. No adenopathy. No thyromegaly.   Nontender C-spine rotation right and left prov Patient/Caregiver Education: There are no barriers to learning. Medical education done. Outcome: Patient verbalizes understanding. Patient is notified to call with any questions, comp lications, allergies, or worsening or changing symptoms.   Patient is t

## 2019-11-27 ENCOUNTER — TELEPHONE (OUTPATIENT)
Dept: FAMILY MEDICINE CLINIC | Facility: CLINIC | Age: 39
End: 2019-11-27

## 2019-11-27 DIAGNOSIS — H81.13 BENIGN PAROXYSMAL POSITIONAL VERTIGO DUE TO BILATERAL VESTIBULAR DISORDER: Primary | ICD-10-CM

## 2019-11-27 NOTE — TELEPHONE ENCOUNTER
Last OV 11/21/2019  Patient requesting referral for PT for paroxysmal positional vertigo  Reports she previously  went to [facility] in [facility] location  Order pended without details

## 2019-11-27 NOTE — TELEPHONE ENCOUNTER
Pt is requesting an order for PT for the closet location in [place]. Pt does not have insurance. Pt states that her symptoms are getting worse and would like to start with physical therapy.

## 2019-11-29 NOTE — TELEPHONE ENCOUNTER
Patient called to change [facility] location  [facility] and [place] and [place]    Fax to [phone] upon approval

## 2019-12-02 NOTE — TELEPHONE ENCOUNTER
Patient report she is uninsured and will pay out of pocket for PT  Order faxed. conf recd.  Patient notified

## 2020-01-23 ENCOUNTER — OFFICE VISIT (OUTPATIENT)
Dept: FAMILY MEDICINE CLINIC | Facility: CLINIC | Age: 40
End: 2020-01-23
Payer: COMMERCIAL

## 2020-01-23 VITALS
OXYGEN SATURATION: 99 % | HEIGHT: 64 IN | HEART RATE: 66 BPM | TEMPERATURE: 99 F | SYSTOLIC BLOOD PRESSURE: 96 MMHG | BODY MASS INDEX: 28.85 KG/M2 | RESPIRATION RATE: 18 BRPM | DIASTOLIC BLOOD PRESSURE: 60 MMHG | WEIGHT: 169 LBS

## 2020-01-23 DIAGNOSIS — G44.209 TENSION HEADACHE: ICD-10-CM

## 2020-01-23 DIAGNOSIS — H65.03 NON-RECURRENT ACUTE SEROUS OTITIS MEDIA OF BOTH EARS: ICD-10-CM

## 2020-01-23 DIAGNOSIS — Z23 NEED FOR VACCINATION: ICD-10-CM

## 2020-01-23 DIAGNOSIS — H81.11 BENIGN PAROXYSMAL POSITIONAL VERTIGO OF RIGHT EAR: ICD-10-CM

## 2020-01-23 DIAGNOSIS — M50.90 CERVICAL NECK PAIN WITH EVIDENCE OF DISC DISEASE: Primary | ICD-10-CM

## 2020-01-23 PROCEDURE — 90471 IMMUNIZATION ADMIN: CPT | Performed by: FAMILY MEDICINE

## 2020-01-23 PROCEDURE — 90686 IIV4 VACC NO PRSV 0.5 ML IM: CPT | Performed by: FAMILY MEDICINE

## 2020-01-23 PROCEDURE — 99214 OFFICE O/P EST MOD 30 MIN: CPT | Performed by: FAMILY MEDICINE

## 2020-01-23 RX ORDER — MECLIZINE HYDROCHLORIDE 25 MG/1
25 TABLET ORAL 3 TIMES DAILY PRN
Qty: 30 TABLET | Refills: 0 | Status: SHIPPED | OUTPATIENT
Start: 2020-01-23

## 2020-01-23 RX ORDER — FLUTICASONE PROPIONATE 50 MCG
2 SPRAY, SUSPENSION (ML) NASAL DAILY
Qty: 1 BOTTLE | Refills: 0 | Status: SHIPPED | OUTPATIENT
Start: 2020-01-23 | End: 2021-01-17

## 2020-01-23 RX ORDER — CARISOPRODOL 250 MG/1
250 TABLET ORAL NIGHTLY PRN
Qty: 20 TABLET | Refills: 0 | Status: SHIPPED | OUTPATIENT
Start: 2020-01-23

## 2020-01-23 NOTE — PROGRESS NOTES
CHIEF COMPLAINT: Patient presents with:  Dizziness  Referral: requesting PT referral   Neck Pain: x 1 week     HPI:     Geoffrey Carvalho is a 37year old female presents for recurrent neck pain both bilateral left and right side x1 week.   Patient adm of room spinning that was exacerbated with rotation to the right greater than the left. She vomited several times without relief stomach contents nonbilious. And 3 days ago only had 4 episode of watery brown diarrhea and none since.   She denies melena or less      Comment: social    Drug use: No       Medications (Active prior to today's visit):  No current outpatient medications on file.        Allergies:    Avocado                 PAIN    Comment:cramping  Gluten Flour            RASH  Pineapple Juice wheezes. Good inspiratory and expiratory effort  Abdomen: soft, nontender, nondistended. NL bowel sounds. Extremities: No cyanosis, clubbing, or edema bilaterally. Upper extremity MS +5/5 b/l equal and symmetric.  Upper extremty DTR +2/4 b/l equal and sym if causes sedation for 8 hours). Dispense: 30 tablet; Refill: 0  - PHYSICAL THERAPY EXTERNAL  Normal neuro exam  Restart physical therapy. Suspicious vertigo may be partly related to recent infection with moderate fluid in both ears.     4. Tension headac sooner if needed. , if worse.

## 2020-01-23 NOTE — PATIENT INSTRUCTIONS
As of October 6th 2014, the Drug Enforcement Agency [facility]) is reclassifying all hydrocodone combination medications from Schedule III to Schedule II. This includes medications such as Norco, Vicodin, Lortab, Zohydro, and Vicoprofen.      What this means for can help control symptoms. Your doctor may prescribe medicines for a few weeks and then taper them off. Always take your medicine as prescribed. Never share your medicine with others.   Contact your healthcare provider right away if you have side effects fr that involve jumping, running, and sudden starts, stops, or changes of direction can jar your neck and head. This may lead to tight muscles and pain.  Weightlifting and other activities that require upper body strength can lead to tight neck and shoulder mu breathing, visualization, progressive relaxation, and biofeedback. Regular sleep habits can also help to control stress. · Regular sleep and meals. It is important to have a regular sleep cycle and to avoid skipping meals.   Exercise can help  Exercise can better when you wake up. · Put heat on the back of your neck to help ease neck spasm. ·   How to prevent tension-type headaches  · Figure out what is causing stress in your life. Learn new ways to handle your stress.  Ideas include regular exercise, biofe [place], [address]. All rights reserved. This information is not intended as a substitute for professional medical care. Always follow your healthcare professional's instructions.         Neck Pain  There are several possible causes of neck pain when there i shower or bath or a moist towel heated in the microwave and massage. Others prefer cold packs.  You can make an ice pack by filling a plastic bag that seals at the top with ice cubes or crushed ice and then wrapping it with a thin towel. Try both and use th instructions. Understanding Neck Problems       If you suffer from neck pain, you’re not alone. Many people have neck pain at some point in their lives. Problems such as poor posture, injury, and wear and tear can lead to neck pain.  Your healthcare arms, or shoulders comfortably if you have muscle tension or stiffness in your neck. If your symptoms aren’t relieved, you may experience muscle spasms, or knots of contracted tissue (trigger points) in areas of your neck and shoulders. · Aches and pains.

## 2020-07-30 NOTE — PROGRESS NOTES
CHIEF COMPLAINT:   Patient presents with:  Pharyngitis      HPI:   Susan Esteves is a 42year old female who presents for upper respiratory symptoms for  4 weeks.  Patient reports sore throat, congestion, low grade fever, cough with yellow colore rashes, no suspicious lesions  HEAD: atraumatic, normocephalic.  moderate tenderness on palpation of maxillary sinuses.   EYES: conjunctiva clear, EOM intact  EARS: TM's clear, no bulging, mild retraction on left with prominent landmarks,small clear visible 0

## 2020-10-01 ENCOUNTER — IMMUNIZATION (OUTPATIENT)
Dept: FAMILY MEDICINE CLINIC | Facility: CLINIC | Age: 40
End: 2020-10-01
Payer: COMMERCIAL

## 2020-10-01 DIAGNOSIS — Z23 NEED FOR VACCINATION: ICD-10-CM

## 2020-10-01 PROCEDURE — 90686 IIV4 VACC NO PRSV 0.5 ML IM: CPT | Performed by: FAMILY MEDICINE

## 2020-10-01 PROCEDURE — 90471 IMMUNIZATION ADMIN: CPT | Performed by: FAMILY MEDICINE

## 2020-10-22 PROBLEM — Z92.89 HISTORY OF ENDOMETRIAL BIOPSY: Status: ACTIVE | Noted: 2020-10-22

## 2020-11-05 ENCOUNTER — OFFICE VISIT (OUTPATIENT)
Dept: FAMILY MEDICINE CLINIC | Facility: CLINIC | Age: 40
End: 2020-11-05
Payer: COMMERCIAL

## 2020-11-05 VITALS
TEMPERATURE: 98 F | OXYGEN SATURATION: 98 % | WEIGHT: 173.63 LBS | HEART RATE: 104 BPM | BODY MASS INDEX: 29.64 KG/M2 | HEIGHT: 64 IN | DIASTOLIC BLOOD PRESSURE: 60 MMHG | SYSTOLIC BLOOD PRESSURE: 118 MMHG | RESPIRATION RATE: 16 BRPM

## 2020-11-05 DIAGNOSIS — Z13.228 SCREENING FOR ENDOCRINE, NUTRITIONAL, METABOLIC AND IMMUNITY DISORDER: ICD-10-CM

## 2020-11-05 DIAGNOSIS — Z13.21 SCREENING FOR ENDOCRINE, NUTRITIONAL, METABOLIC AND IMMUNITY DISORDER: ICD-10-CM

## 2020-11-05 DIAGNOSIS — Z13.0 SCREENING FOR ENDOCRINE, NUTRITIONAL, METABOLIC AND IMMUNITY DISORDER: ICD-10-CM

## 2020-11-05 DIAGNOSIS — Z12.31 ENCOUNTER FOR SCREENING MAMMOGRAM FOR MALIGNANT NEOPLASM OF BREAST: ICD-10-CM

## 2020-11-05 DIAGNOSIS — Z13.29 SCREENING FOR ENDOCRINE, NUTRITIONAL, METABOLIC AND IMMUNITY DISORDER: ICD-10-CM

## 2020-11-05 DIAGNOSIS — Z13.0 SCREENING FOR IRON DEFICIENCY ANEMIA: ICD-10-CM

## 2020-11-05 DIAGNOSIS — E78.5 DYSLIPIDEMIA (HIGH LDL; LOW HDL): ICD-10-CM

## 2020-11-05 DIAGNOSIS — Z00.00 ANNUAL PHYSICAL EXAM: Primary | ICD-10-CM

## 2020-11-05 PROCEDURE — 3078F DIAST BP <80 MM HG: CPT | Performed by: FAMILY MEDICINE

## 2020-11-05 PROCEDURE — 99396 PREV VISIT EST AGE 40-64: CPT | Performed by: FAMILY MEDICINE

## 2020-11-05 PROCEDURE — 99072 ADDL SUPL MATRL&STAF TM PHE: CPT | Performed by: FAMILY MEDICINE

## 2020-11-05 PROCEDURE — 3074F SYST BP LT 130 MM HG: CPT | Performed by: FAMILY MEDICINE

## 2020-11-05 PROCEDURE — 3008F BODY MASS INDEX DOCD: CPT | Performed by: FAMILY MEDICINE

## 2020-11-05 NOTE — PATIENT INSTRUCTIONS
Perform labs fasting 8 hours with water or black coffee or or black tea diet  soda only prior to exam.    -Encourage healthy diet of whole food and avoid processed food and sugary drinks and sodas.   Diet should include lean meats and vegetables including 5 exercise? Exercising regularly offers many healthy rewards.  It can help you do all of the following:  · Improve your blood cholesterol level to help prevent further heart trouble  · Lower your blood pressure to help prevent a stroke or heart attack  · Con substitute for professional medical care. Always follow your healthcare professional's instructions. Eating Heart-Healthy Foods  Eating has a big impact on your heart health. In fact, eating healthier can improve several of your heart risks at once.  Fernando Somers keep a diary to record what you eat and how often you exercise. Choose the right foods  Aim to make these foods staples of your diet.  If you have diabetes, you may have different recommendations than what is listed here:  · Fruits and vegetables provide p spice up your food without adding calories, fat, or sodium. Try these items: horseradish, hot sauce, lemon, mustard, nonfat salad dressings, and vinegar. For salt-free herbs and spices, try basil, cilantro, cinnamon, pepper, and rosemary.   Date Last Review increased risk for fractures. With age, the quality and quantity of bone declines. You can lessen bone loss by staying active and increasing your calcium intake. Calcium supplements and other osteoporosis treatments do have risks.  So talk with your healthc may vary depending on brand and size.   Daily calcium needs  15to 25years old: 1,300 mg  23to 27years old: 1,000 mg  32to 48years old: 1,000 mg  46to 79years old, women: 1,200 mg  46to 79years old, men: 1,000 mg  Pregnant or nursin to 18 year disease  · Have dark skin pigmentation  · Being a  baby  Vitamin D has many effects in the body.  You may need this test to help your healthcare provider diagnose or treat:  · Problems with the parathyroid gland  · Cancer  · Autoimmune diseases, black vitamin D in supplement form can affect your vitamin D levels. Ask your healthcare provider if any health conditions you have or medicines you take could affect your results.   How do I get ready for this test?  Tell your healthcare provider if you take vit [address]. [address] [facility], [address] [place] [address]. All rights reserved. This information is not intended as a substitute for professional medical care. Always follow your healthcare professional's instructions.           Living with Jillianjuan Fee

## 2020-11-05 NOTE — PROGRESS NOTES
REASON FOR VISIT:    Mya Ni is a 37year old female who presents for an [address]     No complaints    , monogamous    menses: irregular since  tubal pregnancy tx at [facility] with partial removal of tubes and tubal • Fluticasone Propionate 50 MCG/ACT Nasal Suspension 2 sprays by Nasal route daily. 1 Bottle 0   • Carisoprodol 250 MG Oral Tab Take 1 tablet (250 mg total) by mouth nightly as needed (no driving or operating machinery for 8h after ingestion. avoid Etoh). Little interest or pleasure in doing things (over the last two weeks)?: Not at all    Feeling down, depressed, or hopeless (over the last two weeks)?: Not at all    PHQ-2 SCORE: 0        PREVENTATIVE SERVICES  INDICATIONS AND SCHEDULE Recommendation Intern SPECIFIC DISEASE MONITORING Internal Lab or Procedure External Lab or Procedure   Annual Monitoring of Persistent     Medications (ACE/ARB, digoxin, diuretics)    Potassium  Annually Potassium (mmol/L)   Date Value   10/19/2018 4.1     POTASSIUM (mmol/L) Past Surgical History:   Procedure Laterality Date   • OTHER SURGICAL HISTORY  2012    cervical polypectomy : benign   • TUBAL LIGATION  2011    left tubal ligation (right salpingectomy)      Family History   Problem Relation Age of Onset   • Breast Cancer EYES:PERRLA, EOMI, normal optic disk, conjunctiva are clear  NECK: supple, no adenopathy, no bruits  CHEST: no chest tenderness  BREAST: no dominant or suspicious mass, axilla clear bilateral  LUNGS: clear to auscultation  CARDIO: RRR without murmur  GI: g - CBC WITH DIFFERENTIAL WITH PLATELET; Future  - COMP METABOLIC PANEL (14); Future  - TSH W REFLEX TO FREE T4; Future  - [facility] DIONICIO 2D+3D SCREENING BILAT (CPT=77067/85308); Future    2. Dyslipidemia (high LDL; low HDL)  - LIPID PANEL;  Future  Mediterranean d

## 2020-11-09 ENCOUNTER — TELEPHONE (OUTPATIENT)
Dept: FAMILY MEDICINE CLINIC | Facility: CLINIC | Age: 40
End: 2020-11-09

## 2020-11-09 NOTE — TELEPHONE ENCOUNTER
Patient called to inform us that she was in the office on 11/5/2020 and her  tested COVID positive on Saturday, 11/7/2020.  Pt states that she has an appt today at an [facility] care center for COVID testing but was told it could take 2-3 days for the

## 2020-11-23 ENCOUNTER — TELEPHONE (OUTPATIENT)
Dept: FAMILY MEDICINE CLINIC | Facility: CLINIC | Age: 40
End: 2020-11-23

## 2020-11-23 NOTE — TELEPHONE ENCOUNTER
Received call from patient. She requires signature of PCP on a life certificate for a POA document. Life certificate proves that she is currently living. Okay to complete form?

## 2020-11-25 NOTE — TELEPHONE ENCOUNTER
Spoke to pt and let her know form has been completed and signed. Ready for . Pt will  today. Placed at  and advised to call office when she arrives and bring ID in building.  Copy to scan

## 2020-12-08 ENCOUNTER — TELEPHONE (OUTPATIENT)
Dept: FAMILY MEDICINE CLINIC | Facility: CLINIC | Age: 40
End: 2020-12-08

## 2020-12-08 NOTE — TELEPHONE ENCOUNTER
Patient signed HIPAA medical records authorization form for the Facility identified below to disclose patient health information to Howard Teresa / Provider Name: Dr. Ney Montana Address: [address] [place] [address].   [address] [place] [address]

## 2020-12-29 ENCOUNTER — NURSE ONLY (OUTPATIENT)
Dept: FAMILY MEDICINE CLINIC | Facility: CLINIC | Age: 40
End: 2020-12-29
Payer: COMMERCIAL

## 2020-12-29 DIAGNOSIS — Z13.0 SCREENING FOR ENDOCRINE, NUTRITIONAL, METABOLIC AND IMMUNITY DISORDER: ICD-10-CM

## 2020-12-29 DIAGNOSIS — Z13.0 SCREENING FOR IRON DEFICIENCY ANEMIA: ICD-10-CM

## 2020-12-29 DIAGNOSIS — Z13.228 SCREENING FOR ENDOCRINE, NUTRITIONAL, METABOLIC AND IMMUNITY DISORDER: ICD-10-CM

## 2020-12-29 DIAGNOSIS — E78.5 DYSLIPIDEMIA (HIGH LDL; LOW HDL): ICD-10-CM

## 2020-12-29 DIAGNOSIS — Z00.00 ANNUAL PHYSICAL EXAM: ICD-10-CM

## 2020-12-29 DIAGNOSIS — Z13.29 SCREENING FOR ENDOCRINE, NUTRITIONAL, METABOLIC AND IMMUNITY DISORDER: ICD-10-CM

## 2020-12-29 DIAGNOSIS — Z13.21 SCREENING FOR ENDOCRINE, NUTRITIONAL, METABOLIC AND IMMUNITY DISORDER: ICD-10-CM

## 2020-12-29 PROCEDURE — 84443 ASSAY THYROID STIM HORMONE: CPT | Performed by: FAMILY MEDICINE

## 2020-12-29 PROCEDURE — 80061 LIPID PANEL: CPT | Performed by: FAMILY MEDICINE

## 2020-12-29 PROCEDURE — 80053 COMPREHEN METABOLIC PANEL: CPT | Performed by: FAMILY MEDICINE

## 2020-12-29 PROCEDURE — 85025 COMPLETE CBC W/AUTO DIFF WBC: CPT | Performed by: FAMILY MEDICINE

## 2020-12-29 PROCEDURE — 36415 COLL VENOUS BLD VENIPUNCTURE: CPT | Performed by: FAMILY MEDICINE

## 2020-12-29 NOTE — PROGRESS NOTES
Patient came in for draw of ordered fasting labs. Patient drawn out of L AC, x 1 attempt and tolerated well. 1 lt grn and 1 lav tube drawn.

## 2020-12-30 NOTE — PROGRESS NOTES
Patient notified of results. Patient verbalized understanding.    Copy of results mailed to patient, per patient request.

## 2021-02-02 ENCOUNTER — OFFICE VISIT (OUTPATIENT)
Dept: FAMILY MEDICINE CLINIC | Facility: CLINIC | Age: 41
End: 2021-02-02
Payer: COMMERCIAL

## 2021-02-02 VITALS
DIASTOLIC BLOOD PRESSURE: 74 MMHG | TEMPERATURE: 97 F | HEART RATE: 95 BPM | WEIGHT: 179 LBS | OXYGEN SATURATION: 100 % | HEIGHT: 64 IN | BODY MASS INDEX: 30.56 KG/M2 | SYSTOLIC BLOOD PRESSURE: 120 MMHG | RESPIRATION RATE: 18 BRPM

## 2021-02-02 DIAGNOSIS — R30.0 DYSURIA: ICD-10-CM

## 2021-02-02 DIAGNOSIS — N30.01 ACUTE CYSTITIS WITH HEMATURIA: Primary | ICD-10-CM

## 2021-02-02 LAB
APPEARANCE: CLEAR
BILIRUBIN: NEGATIVE
GLUCOSE (URINE DIPSTICK): NEGATIVE MG/DL
KETONES (URINE DIPSTICK): NEGATIVE MG/DL
MULTISTIX LOT#: 5077 NUMERIC
NITRITE, URINE: NEGATIVE
PH, URINE: 5.5 (ref 4.5–8)
PROTEIN (URINE DIPSTICK): NEGATIVE MG/DL
SPECIFIC GRAVITY: 1.01 (ref 1–1.03)
URINE-COLOR: YELLOW
UROBILINOGEN,SEMI-QN: 0.2 MG/DL (ref 0–1.9)

## 2021-02-02 PROCEDURE — 99214 OFFICE O/P EST MOD 30 MIN: CPT | Performed by: FAMILY MEDICINE

## 2021-02-02 PROCEDURE — 3008F BODY MASS INDEX DOCD: CPT | Performed by: FAMILY MEDICINE

## 2021-02-02 PROCEDURE — 81003 URINALYSIS AUTO W/O SCOPE: CPT | Performed by: FAMILY MEDICINE

## 2021-02-02 PROCEDURE — 3074F SYST BP LT 130 MM HG: CPT | Performed by: FAMILY MEDICINE

## 2021-02-02 PROCEDURE — 87086 URINE CULTURE/COLONY COUNT: CPT | Performed by: FAMILY MEDICINE

## 2021-02-02 PROCEDURE — 3078F DIAST BP <80 MM HG: CPT | Performed by: FAMILY MEDICINE

## 2021-02-02 RX ORDER — NITROFURANTOIN 25; 75 MG/1; MG/1
100 CAPSULE ORAL 2 TIMES DAILY
Qty: 14 CAPSULE | Refills: 0 | Status: SHIPPED | OUTPATIENT
Start: 2021-02-02 | End: 2021-02-09

## 2021-02-02 NOTE — PROGRESS NOTES
CHIEF COMPLAINT: Patient presents with:  Burning On Urination: x10 days        HPI:     Reid Russo is a 41year old female presents for dysuria. Dysuria sx: Pt has had burning with urination x 10 days.  She states the burning sensation is mil Nitrofurantoin Monohyd Macro 100 MG Oral Cap Take 1 capsule (100 mg total) by mouth 2 (two) times daily for 7 days.  14 capsule 0   • Meclizine HCl 25 MG Oral Tab Take 1 tablet (25 mg total) by mouth 3 (three) times daily as needed for Dizziness or Nausea ( hepatosplenomegaly. There is no abdominal tenderness. There is no guarding. No CVAT  No suprapubic tenderness     Neurological: She is alert and oriented to person, place, and time. Skin: Skin is warm and dry.    Psychiatric: She has a normal mood and a 12.6    • HCT 12/29/2020 38.8    • PLT 12/29/2020 240.0    • MCV 12/29/2020 87.4    • MCH 12/29/2020 28.4    • MCHC 12/29/2020 32.5    • RDW 12/29/2020 12.4    • RDW-SD 12/29/2020 39.1    • Neutrophil Absolute Prel* 12/29/2020 4.83    • Neutrophil Absolute plan. Advised to call or RTC if symptoms persist or worsen.     Problem List:   Patient Active Problem List:     Primary osteoarthritis of both hands     Dyslipidemia (high LDL; low HDL)     Vitamin D deficiency     Palpitations     Left cervical radiculopa

## 2021-02-04 NOTE — PROGRESS NOTES
Please call pt to inform of results:  - urine culture did not grow any bacteria (this may be because the UTI may have been resolving by the time we tested her, since she had symptoms fro 10 days already)  - if she is not feeling any better on the [facility]

## 2021-02-05 ENCOUNTER — TELEPHONE (OUTPATIENT)
Dept: FAMILY MEDICINE CLINIC | Facility: CLINIC | Age: 41
End: 2021-02-05

## 2021-02-05 NOTE — TELEPHONE ENCOUNTER
Spoke to pt and let them know results and recommendation per Dr Tristan Kincaid. .  Patient voiced understanding.  She states she is feeling better on the medication   Will discontinue and call back if symptoms return

## 2021-02-05 NOTE — TELEPHONE ENCOUNTER
----- Message from Tyler Lassiter MD sent at 2/4/2021 12:10 PM CST -----  Please call pt to inform of results:  - urine culture did not grow any bacteria (this may be because the UTI may have been resolving by the time we tested her, since she had

## 2021-10-09 ENCOUNTER — HOSPITAL ENCOUNTER (OUTPATIENT)
Dept: MAMMOGRAPHY | Facility: HOSPITAL | Age: 41
Discharge: HOME | End: 2021-10-09
Attending: FAMILY MEDICINE
Payer: COMMERCIAL

## 2021-10-09 DIAGNOSIS — Z12.31 ENCOUNTER FOR SCREENING MAMMOGRAM FOR MALIGNANT NEOPLASM OF BREAST: ICD-10-CM

## 2021-10-09 DIAGNOSIS — Z00.00 ANNUAL PHYSICAL EXAM: ICD-10-CM

## 2021-10-09 PROCEDURE — 77067 SCR MAMMO BI INCL CAD: CPT | Performed by: FAMILY MEDICINE

## 2021-10-09 PROCEDURE — 77063 BREAST TOMOSYNTHESIS BI: CPT | Performed by: FAMILY MEDICINE

## 2021-10-13 NOTE — PROGRESS NOTES
Results reviewed. Released to [employer]. See my chart message to patient. Recommended complete breast ultrasound. Order placed. Dear Jamie Corcoran    Normal mammogram except for dense breasts.   Radiologist is recommending a new test to assess b

## 2022-01-21 ENCOUNTER — TELEMEDICINE (OUTPATIENT)
Dept: FAMILY MEDICINE CLINIC | Facility: CLINIC | Age: 42
End: 2022-01-21

## 2022-01-21 DIAGNOSIS — S16.1XXA ACUTE STRAIN OF NECK MUSCLE, INITIAL ENCOUNTER: Primary | ICD-10-CM

## 2022-01-21 PROBLEM — H65.03 NON-RECURRENT ACUTE SEROUS OTITIS MEDIA OF BOTH EARS: Status: RESOLVED | Noted: 2020-01-23 | Resolved: 2022-01-21

## 2022-01-21 PROCEDURE — 99213 OFFICE O/P EST LOW 20 MIN: CPT | Performed by: FAMILY MEDICINE

## 2022-01-21 NOTE — PROGRESS NOTES
Video Visit    This visit is conducted using Telemedicine with live, interactive video and audio. Palomo Marie  verbally consents to a Video visit.     Patient understands and accepts financial responsibility for any deductible, co-insurance an

## 2022-01-21 NOTE — PATIENT INSTRUCTIONS
Understanding Cervical Strain    There are 7 bones (vertebrae) in the neck that are part of the spine. These are called the cervical spine. Cervical strain is a medical term for neck pain. The neck has several layers of muscles.  These are connected wit that gets worse  · Symptoms that don’t get better, or get worse  · Numbness, tingling, weakness or shooting pains into the arms or legs  · New symptoms  Kelly last reviewed this educational content on 6/1/2019    © 8974-8912 [employer] [address].  A

## 2022-02-11 ENCOUNTER — OFFICE VISIT (OUTPATIENT)
Dept: FAMILY MEDICINE CLINIC | Facility: CLINIC | Age: 42
End: 2022-02-11
Payer: COMMERCIAL

## 2022-02-11 VITALS
WEIGHT: 173 LBS | OXYGEN SATURATION: 99 % | RESPIRATION RATE: 16 BRPM | BODY MASS INDEX: 29.53 KG/M2 | SYSTOLIC BLOOD PRESSURE: 108 MMHG | HEIGHT: 64 IN | HEART RATE: 83 BPM | TEMPERATURE: 97 F | DIASTOLIC BLOOD PRESSURE: 60 MMHG

## 2022-02-11 DIAGNOSIS — M50.90 CERVICAL NECK PAIN WITH EVIDENCE OF DISC DISEASE: Primary | ICD-10-CM

## 2022-02-11 PROCEDURE — 3074F SYST BP LT 130 MM HG: CPT | Performed by: FAMILY MEDICINE

## 2022-02-11 PROCEDURE — 99214 OFFICE O/P EST MOD 30 MIN: CPT | Performed by: FAMILY MEDICINE

## 2022-02-11 PROCEDURE — 3078F DIAST BP <80 MM HG: CPT | Performed by: FAMILY MEDICINE

## 2022-02-11 PROCEDURE — 3008F BODY MASS INDEX DOCD: CPT | Performed by: FAMILY MEDICINE

## 2022-02-11 RX ORDER — CARISOPRODOL 250 MG/1
250 TABLET ORAL NIGHTLY PRN
Qty: 20 TABLET | Refills: 0 | Status: SHIPPED | OUTPATIENT
Start: 2022-02-11 | End: 2022-03-11 | Stop reason: ALTCHOICE

## 2022-02-11 RX ORDER — NAPROXEN 500 MG/1
500 TABLET ORAL 2 TIMES DAILY WITH MEALS
Qty: 60 TABLET | Refills: 0 | Status: SHIPPED | OUTPATIENT
Start: 2022-02-11 | End: 2022-03-11 | Stop reason: ALTCHOICE

## 2022-02-11 RX ORDER — OMEPRAZOLE 20 MG/1
CAPSULE, DELAYED RELEASE ORAL
Qty: 30 CAPSULE | Refills: 1 | Status: SHIPPED | OUTPATIENT
Start: 2022-02-11 | End: 2022-03-11 | Stop reason: ALTCHOICE

## 2022-02-14 ENCOUNTER — HOSPITAL ENCOUNTER (OUTPATIENT)
Dept: GENERAL RADIOLOGY | Facility: HOSPITAL | Age: 42
Discharge: HOME | End: 2022-02-14
Attending: FAMILY MEDICINE
Payer: COMMERCIAL

## 2022-02-14 DIAGNOSIS — M50.90 CERVICAL NECK PAIN WITH EVIDENCE OF DISC DISEASE: ICD-10-CM

## 2022-02-14 PROCEDURE — 72050 X-RAY EXAM NECK SPINE 4/5VWS: CPT | Performed by: FAMILY MEDICINE

## 2022-02-22 ENCOUNTER — TELEPHONE (OUTPATIENT)
Dept: PHYSICAL THERAPY | Facility: HOSPITAL | Age: 42
End: 2022-02-22

## 2022-02-24 ENCOUNTER — APPOINTMENT (OUTPATIENT)
Dept: PHYSICAL THERAPY | Facility: HOSPITAL | Age: 42
End: 2022-02-24
Attending: FAMILY MEDICINE

## 2022-03-01 ENCOUNTER — OFFICE VISIT (OUTPATIENT)
Dept: PHYSICAL THERAPY | Facility: HOSPITAL | Age: 42
End: 2022-03-01
Attending: FAMILY MEDICINE
Payer: COMMERCIAL

## 2022-03-01 PROCEDURE — 97161 PT EVAL LOW COMPLEX 20 MIN: CPT

## 2022-03-01 PROCEDURE — 97110 THERAPEUTIC EXERCISES: CPT

## 2022-03-10 ENCOUNTER — APPOINTMENT (OUTPATIENT)
Dept: PHYSICAL THERAPY | Facility: HOSPITAL | Age: 42
End: 2022-03-10
Attending: FAMILY MEDICINE
Payer: COMMERCIAL

## 2022-03-10 NOTE — PROGRESS NOTES
Dx: Cervical neck pain with evidence of disc disease (M50.90)            Insurance (Authorized # of Visits):  BCBS PPO 10 per POC         Authorizing Physician: Dr. Marta Law MD visit: none scheduled  Fall Risk: standard         Precautions: n/a             Subjective: ***    Pain: ***/10      Objective: ***  Observation/Posture: Pectoralis minor tightness B in supine. Utilizes good sitting posture with feet flat and shoulders back. Cervical AROM: (* denotes performed with pain)  Flexion: 30*(upper posterior neck-stretch)  Extension: 31* (pinch posterior lower neck)  Sidebending: R 29(good L stretch); L 26 (good stretch on R)  Rotation: R 50; L 49     Anterior R quadrant: + L pain  Anterior L quadrant: + pain middle/L  Posterior R: nausea, non painful  Posterior L: + very positive and reproduces LUE symptoms  Palpation: Muscle restrictions in B upper Trapezius, B cervical paraspinals, and B pectoralis minor. Strength: (* denotes performed with pain)  UE/Scapular   Shoulder Flex: R 4/5, L 4/5  Shoulder ABD (C5): R 4/5, L 4/5  Biceps (C6): R 5/5, L 5/5  Wrist ext (C6): R 5/5, L 5/5  Triceps (C7): R 5/5, L 5/5  Wrist Flex (C7): R 5/5, L 5/5  Digit Flex (C8): R 5/5, L 5/5  Thumb Ext (C8): R 5/5, L 5/5  Interossei (T1): R 4/5, L 4/5         Special tests:   Deep Neck Flexor Endurance: 3 sec       Assessment: ***    Patient was instructed in and issued a HEP for:  Access Code: [email or web address]  Exercises  Seated Cervical Retraction - 2 x daily - 7 x weekly - 2 sets - 10 reps - 5 hold  Open Book Chest Rotation Stretch on Foam 1/2 Roll - 2 x daily - 7 x weekly - 3 sets - 25 hold  Cervical rainbow within pain free range, 4x daily 2 x 5 reps     Goals:   Goals: (to be met in 10 visits)   Patient will modify desk setup at home for improved ergonomics and posture throughout workday. Patient will increase cervical rotation bilaterally to 70 degrees for increasing safety while driving to check blind spots.   Patient will improve deep neck flexor testing score to 12 seconds to improve tolerance and posture with sitting throughout the work day. Patient will be able to look down to draw with <1/10 pain for recreational activity. Patient will be compliant with HEP to promote independence with patient care. Patient will improve bilateral shoulder flexion and abduction strength to 4+/5 to decrease accessory scapular elevator muscle usage and therefore decrease pain. Patient will be able to scan between all computer monitors in sitting with <1/10 pain. Plan: ***  Date: 3/10/2022  TX#: 2/*** Date:                 TX#: 3/ Date:                 TX#: 4/ Date:                 TX#: 5/ Date:    Tx#: 6/   Manual Therapy         Therapeutic Exercise:   HEP review                       HEP: ***    Charges: ***       Total Timed Treatment: *** min  Total Treatment Time: *** min

## 2022-03-11 ENCOUNTER — OFFICE VISIT (OUTPATIENT)
Dept: FAMILY MEDICINE CLINIC | Facility: CLINIC | Age: 42
End: 2022-03-11
Payer: COMMERCIAL

## 2022-03-11 VITALS
TEMPERATURE: 98 F | RESPIRATION RATE: 18 BRPM | DIASTOLIC BLOOD PRESSURE: 60 MMHG | HEART RATE: 94 BPM | WEIGHT: 173.38 LBS | HEIGHT: 64 IN | SYSTOLIC BLOOD PRESSURE: 114 MMHG | OXYGEN SATURATION: 100 % | BODY MASS INDEX: 29.6 KG/M2

## 2022-03-11 DIAGNOSIS — N76.0 BACTERIAL VAGINOSIS: ICD-10-CM

## 2022-03-11 DIAGNOSIS — N30.00 ACUTE CYSTITIS WITHOUT HEMATURIA: ICD-10-CM

## 2022-03-11 DIAGNOSIS — B37.3 CANDIDA VAGINITIS: ICD-10-CM

## 2022-03-11 DIAGNOSIS — B96.89 BACTERIAL VAGINOSIS: ICD-10-CM

## 2022-03-11 DIAGNOSIS — R30.0 DYSURIA: ICD-10-CM

## 2022-03-11 DIAGNOSIS — N89.8 VAGINAL DISCHARGE: Primary | ICD-10-CM

## 2022-03-11 LAB
APPEARANCE: CLEAR
BILIRUBIN: NEGATIVE
GLUCOSE (URINE DIPSTICK): NEGATIVE MG/DL
KETONES (URINE DIPSTICK): NEGATIVE MG/DL
MULTISTIX LOT#: ABNORMAL NUMERIC
NITRITE, URINE: NEGATIVE
PH, URINE: 6 (ref 4.5–8)
PROTEIN (URINE DIPSTICK): NEGATIVE MG/DL
SPECIFIC GRAVITY: 1.02 (ref 1–1.03)
URINE-COLOR: YELLOW
UROBILINOGEN,SEMI-QN: 0.2 MG/DL (ref 0–1.9)

## 2022-03-11 PROCEDURE — 87660 TRICHOMONAS VAGIN DIR PROBE: CPT | Performed by: FAMILY MEDICINE

## 2022-03-11 PROCEDURE — 87491 CHLMYD TRACH DNA AMP PROBE: CPT | Performed by: FAMILY MEDICINE

## 2022-03-11 PROCEDURE — 87591 N.GONORRHOEAE DNA AMP PROB: CPT | Performed by: FAMILY MEDICINE

## 2022-03-11 PROCEDURE — 3008F BODY MASS INDEX DOCD: CPT | Performed by: FAMILY MEDICINE

## 2022-03-11 PROCEDURE — 81003 URINALYSIS AUTO W/O SCOPE: CPT | Performed by: FAMILY MEDICINE

## 2022-03-11 PROCEDURE — 3078F DIAST BP <80 MM HG: CPT | Performed by: FAMILY MEDICINE

## 2022-03-11 PROCEDURE — 3074F SYST BP LT 130 MM HG: CPT | Performed by: FAMILY MEDICINE

## 2022-03-11 PROCEDURE — 87510 GARDNER VAG DNA DIR PROBE: CPT | Performed by: FAMILY MEDICINE

## 2022-03-11 PROCEDURE — 87480 CANDIDA DNA DIR PROBE: CPT | Performed by: FAMILY MEDICINE

## 2022-03-11 PROCEDURE — 87086 URINE CULTURE/COLONY COUNT: CPT | Performed by: FAMILY MEDICINE

## 2022-03-11 PROCEDURE — 99214 OFFICE O/P EST MOD 30 MIN: CPT | Performed by: FAMILY MEDICINE

## 2022-03-11 RX ORDER — FLUCONAZOLE 150 MG/1
150 TABLET ORAL ONCE
Qty: 1 TABLET | Refills: 0 | Status: SHIPPED | OUTPATIENT
Start: 2022-03-11 | End: 2022-03-11

## 2022-03-11 RX ORDER — METRONIDAZOLE 7.5 MG/G
1 GEL VAGINAL NIGHTLY
Qty: 70 G | Refills: 0 | Status: SHIPPED | OUTPATIENT
Start: 2022-03-11 | End: 2022-03-16

## 2022-03-14 ENCOUNTER — OFFICE VISIT (OUTPATIENT)
Dept: PHYSICAL THERAPY | Facility: HOSPITAL | Age: 42
End: 2022-03-14
Attending: FAMILY MEDICINE
Payer: COMMERCIAL

## 2022-03-14 LAB
C TRACH DNA SPEC QL NAA+PROBE: NEGATIVE
N GONORRHOEA DNA SPEC QL NAA+PROBE: NEGATIVE

## 2022-03-14 PROCEDURE — 97110 THERAPEUTIC EXERCISES: CPT

## 2022-03-14 PROCEDURE — 97140 MANUAL THERAPY 1/> REGIONS: CPT

## 2022-03-14 NOTE — PROGRESS NOTES
Dx: Cervical neck pain with evidence of disc disease (M50.90)            Insurance (Authorized # of Visits):  BCBS PPO 10 per POC         Authorizing Physician: Dr. Guerline Krishnamurthy  Next MD visit: none scheduled  Fall Risk: standard         Precautions: n/a             Subjective: Patient reports she is feeling really good and when she is tight she has been adding in stretches and reports they have helped a lot. Pain: 0/10      Objective:    Observation/Posture: Pectoralis minor tightness B in supine. Utilizes good sitting posture with feet flat and shoulders back. Cervical AROM: (* denotes performed with pain)  Flexion: 30*(upper posterior neck-stretch)  Extension: 31* (pinch posterior lower neck)  Sidebending: R 29(good L stretch); L 26 (good stretch on R)  Rotation: R 50; L 49     3/14/2022  Anterior R quadrant: + L pain  Anterior L quadrant: + pain middle/L  Posterior R: -  Posterior L: + very positive and reproduces LUE symptoms  Palpation: Muscle restrictions in B upper Trapezius, B cervical paraspinals, and B pectoralis minor. Strength: (* denotes performed with pain)  UE/Scapular   Shoulder Flex: R 4/5, L 4/5  Shoulder ABD (C5): R 4/5, L 4/5  Biceps (C6): R 5/5, L 5/5  Wrist ext (C6): R 5/5, L 5/5  Triceps (C7): R 5/5, L 5/5  Wrist Flex (C7): R 5/5, L 5/5  Digit Flex (C8): R 5/5, L 5/5  Thumb Ext (C8): R 5/5, L 5/5  Interossei (T1): R 4/5, L 4/5         Special tests:   Deep Neck Flexor Endurance: 3 sec       Assessment: Patient reports she has been continuing with stretches in HEP and doing them throughout the day when she's feeling tight and that they have been helping a lot with posture throughout the day. L Rotation PPIVM for improved motion and pain relief, patient tolerated well with no increase in pain. ROM screened in sitting and comparable to initial evaluation, highest pain level with L posterior quadrant movement.  Patient tolerated all scapular stabilizing exercises with no increase in pain and good form. HEP updated for patient. Patient's schedule is limited due to work and travel and this may impact functional progressing, had to cancel next visit, next follow up appointment will be 4/4/22. Patient was instructed in and issued a HEP for:  Access Code: [email or web address]  Exercises  Cervical rainbow within pain free range, 4x daily 2 x 5 reps   Doorway Pec Stretch at 60 Elevation - 2 x daily - 7 x weekly - 3 sets - 20-25 hold  Seated Cervical Retraction - 2 x daily - 7 x weekly - 2 sets - 10 reps - 5 hold  Supine Deep Neck Flexor Training - Hold - 1 x daily - 7 x weekly - 3 sets - 5 reps - 3 hold    Goals:   Goals: (to be met in 10 visits)   Patient will modify desk setup at home for improved ergonomics and posture throughout workday. Patient will increase cervical rotation bilaterally to 70 degrees for increasing safety while driving to check blind spots. Patient will improve deep neck flexor testing score to 12 seconds to improve tolerance and posture with sitting throughout the work day. Patient will be able to look down to draw with <1/10 pain for recreational activity. Patient will be compliant with HEP to promote independence with patient care. Patient will improve bilateral shoulder flexion and abduction strength to 4+/5 to decrease accessory scapular elevator muscle usage and therefore decrease pain. Patient will be able to scan between all computer monitors in sitting with <1/10 pain. Plan: Improve deep cervical neck flexor strength, posture training, and improve ergonomics throughout workday  Date: 3/14/2022  TX#: 2/10 Date:                 TX#: 3/ Date:                 TX#: 4/ Date:                 TX#: 5/ Date:    Tx#: 6/   Manual Therapy 12'  Soft tissue assessment of L paraspinals and upper trapezius  L PPIVM rotation 15x5 sec hold  Suboccipital distraction [id number]  P-A cervical assessment        Therapeutic Exercise:   HEP review and update 6'  Supine shoulder extension with RTB and small chin tuck 2x12  Supine Chin tucks with 1-2\"raise, 3 sec hold 3x5  Sitting cervical extension with RTB 2x10   Sitting rotation with RTB 2x10 ea  Doorway pec stretch 3x20 sec   Standing rows with RTB 2x15   Standing horizontal abduction against foam roll 2x15                 This treatment was provided by SYLVIA Huddleston under the direct and constant direction and supervision of a licensed therapist, who provided consultation regarding skilled judgements, treatment, and assessment of patient care.      Charges: 1 Manual, 2 TE       Total Timed Treatment: 42 min  Total Treatment Time: 42 min

## 2022-03-22 ENCOUNTER — APPOINTMENT (OUTPATIENT)
Dept: PHYSICAL THERAPY | Facility: HOSPITAL | Age: 42
End: 2022-03-22
Attending: FAMILY MEDICINE
Payer: COMMERCIAL

## 2022-04-04 ENCOUNTER — TELEPHONE (OUTPATIENT)
Dept: FAMILY MEDICINE CLINIC | Facility: CLINIC | Age: 42
End: 2022-04-04

## 2022-04-04 ENCOUNTER — OFFICE VISIT (OUTPATIENT)
Dept: PHYSICAL THERAPY | Facility: HOSPITAL | Age: 42
End: 2022-04-04
Attending: FAMILY MEDICINE
Payer: COMMERCIAL

## 2022-04-04 PROCEDURE — 97110 THERAPEUTIC EXERCISES: CPT

## 2022-04-04 NOTE — PROGRESS NOTES
Dx: Cervical neck pain with evidence of disc disease (M50.90)            Insurance (Authorized # of Visits):  BCBS PPO 10 per POC         Authorizing Physician: Dr. Nicole Law MD visit: none scheduled  Fall Risk: standard         Precautions: n/a             Subjective: Patient used stretches throughout plane ride to decrease pain and stiffness. Pain: 0/10      Objective:    4/4/2022  Flexion: 50  Extension: 39  Side bending: R 35, L: 41  Rotation: R 70, L 70     Observation/Posture: Pectoralis minor tightness B in supine. Utilizes good sitting posture with feet flat and shoulders back. Cervical AROM: (EVAL) (* denotes performed with pain)  Flexion: 30*(upper posterior neck-stretch)  Extension: 31* (pinch posterior lower neck)  Sidebending: R 29(good L stretch); L 26 (good stretch on R)  Rotation: R 50; L 49     Palpation: Muscle restrictions in B upper Trapezius, B cervical paraspinals, and B pectoralis minor. Strength: (* denotes performed with pain)  UE/Scapular   Shoulder Flex: R 4/5, L 4/5  Shoulder ABD (C5): R 4/5, L 4/5  Biceps (C6): R 5/5, L 5/5  Wrist ext (C6): R 5/5, L 5/5  Triceps (C7): R 5/5, L 5/5  Wrist Flex (C7): R 5/5, L 5/5  Digit Flex (C8): R 5/5, L 5/5  Thumb Ext (C8): R 5/5, L 5/5  Interossei (T1): R 4/5, L 4/5         Special tests:   Deep Neck Flexor Endurance: 3 sec       Assessment: Patient reports she has been continuing with stretches in HEP and doing them throughout the day when she's feeling tight and that they have been helping a lot when she feels stiff. Cervical ROM assessed, patient demonstrates significant improvement as noted above, no pain with any motions. Trialed prone lower trapezius strengthening but patient reported pain so exercise held, able to complete exercise in standing with decreased pain and good form. HEP updated for increased periscapular strengthening.  At end of session, significant tissue restrictions noted in R upper trapezius, will address next session for pain relief and decreased compensation. Patient was instructed in and issued a HEP for:  Access Code: [email or web address]  Date: 04/04/2022  Doorway Pec Stretch at 60 Elevation - 2 x daily - 7 x weekly - 3 sets - 20-25 hold  Supine Deep Neck Flexor Training - Hold - 1 x daily - 7 x weekly - 3 sets - 5 reps - 3 hold  Standing Cervical Rotation with Anchored Resistance - 1 x daily - 7 x weekly - 2 sets - 10 reps  Cervical Retraction with Resistance - 1 x daily - 7 x weekly - 2 sets - 10 reps - 5 hold  Standing Row with Resistance - 1 x daily - 7 x weekly - 2 sets - 12 reps    Goals:   Goals: (to be met in 10 visits)   Patient will modify desk setup at home for improved ergonomics and posture throughout workday. Patient will increase cervical rotation bilaterally to 70 degrees for increasing safety while driving to check blind spots. (MET 4/4)  Patient will improve deep neck flexor testing score to 12 seconds to improve tolerance and posture with sitting throughout the work day. Patient will be able to look down to draw with <1/10 pain for recreational activity. Patient will be compliant with HEP to promote independence with patient care. Patient will improve bilateral shoulder flexion and abduction strength to 4+/5 to decrease accessory scapular elevator muscle usage and therefore decrease pain. Patient will be able to scan between all computer monitors in sitting with <1/10 pain (MET 4/4)    Plan: Improve deep cervical neck flexor strength, posture training. Address R upper trapezius tissue restrictions  Date: 3/14/2022  TX#: 2/10 Date:   4/4/2022              TX#: 3/ Date:                 TX#: 4/ Date:                 TX#: 5/ Date:    Tx#: 6/   Manual Therapy 12'  Soft tissue assessment of L paraspinals and upper trapezius  L PPIVM rotation 15x5 sec hold  Suboccipital distraction [id number]  P-A cervical assessment  Manual Therapy  B upper trapezius stretch 3x30s      Therapeutic Exercise:   HEP review and update 6'  Supine shoulder extension with RTB and small chin tuck 2x12  Supine Chin tucks with 1-2\"raise, 3 sec hold 3x5  Sitting cervical extension with RTB 2x10   Sitting rotation with RTB 2x10 ea  Doorway pec stretch 3x20 sec   Standing rows with RTB 2x15   Standing horizontal abduction against foam roll 2x15    Therapeutic Exercise:   Cervical ROM assessment   Standing rows with RTB 2x15   Seated cervical extension with RTB 2x10   Seated cervical rotation with RTB 2x10  Standing horizontal abduction against foam roll, RTB followed by GTB 2x15   Supine Chin tucks with 1-2\"raise, 3 sec hold 2x10  Scaption against foam roll 3# 2x10  Doorway B pec stretch 3x20s  Standing lower trapezius liftoff at wall 2x10   Prone lower trap attempted, held d/t pain               This treatment was provided by SYLVIA Guerra under the direct and constant direction and supervision of a licensed therapist, who provided consultation regarding skilled judgements, treatment, and assessment of patient care.      Charges: 3 TE       Total Timed Treatment: 42 min  Total Treatment Time: 42 min

## 2022-04-04 NOTE — TELEPHONE ENCOUNTER
Pt called stating that her symptoms from her last visit have not gone away and she wants to know what to do.

## 2022-04-04 NOTE — TELEPHONE ENCOUNTER
Pt with c/o of vaginal bumps that are painful, with yellow discharge. Initially the medication helped reduce discharge and pain but the vaginal bumps never went away after using Rx for BV and yeast infection. She states she is having the exact symptoms as she did in March.   Pt scheduled for apt 04/05/2022 with Dr. Jeannine Valdovinos

## 2022-04-05 ENCOUNTER — OFFICE VISIT (OUTPATIENT)
Dept: FAMILY MEDICINE CLINIC | Facility: CLINIC | Age: 42
End: 2022-04-05
Payer: COMMERCIAL

## 2022-04-05 VITALS
TEMPERATURE: 97 F | DIASTOLIC BLOOD PRESSURE: 62 MMHG | BODY MASS INDEX: 30 KG/M2 | HEART RATE: 85 BPM | OXYGEN SATURATION: 98 % | WEIGHT: 172.63 LBS | SYSTOLIC BLOOD PRESSURE: 98 MMHG | RESPIRATION RATE: 14 BRPM

## 2022-04-05 DIAGNOSIS — N89.8 VAGINAL DISCHARGE: Primary | ICD-10-CM

## 2022-04-05 PROCEDURE — 3078F DIAST BP <80 MM HG: CPT | Performed by: FAMILY MEDICINE

## 2022-04-05 PROCEDURE — 3074F SYST BP LT 130 MM HG: CPT | Performed by: FAMILY MEDICINE

## 2022-04-05 PROCEDURE — 99214 OFFICE O/P EST MOD 30 MIN: CPT | Performed by: FAMILY MEDICINE

## 2022-04-05 RX ORDER — FLUCONAZOLE 150 MG/1
TABLET ORAL
COMMUNITY
Start: 2022-03-12 | End: 2022-04-05 | Stop reason: ALTCHOICE

## 2022-04-05 RX ORDER — METRONIDAZOLE 500 MG/1
500 TABLET ORAL 2 TIMES DAILY
Qty: 14 TABLET | Refills: 0 | Status: SHIPPED | OUTPATIENT
Start: 2022-04-05 | End: 2022-04-12

## 2022-04-05 RX ORDER — FLUCONAZOLE 150 MG/1
150 TABLET ORAL ONCE
Qty: 1 TABLET | Refills: 0 | Status: SHIPPED | OUTPATIENT
Start: 2022-04-05 | End: 2022-04-05

## 2022-04-07 ENCOUNTER — APPOINTMENT (OUTPATIENT)
Dept: PHYSICAL THERAPY | Facility: HOSPITAL | Age: 42
End: 2022-04-07
Attending: FAMILY MEDICINE
Payer: COMMERCIAL

## 2022-04-07 ENCOUNTER — TELEPHONE (OUTPATIENT)
Dept: PHYSICAL THERAPY | Facility: HOSPITAL | Age: 42
End: 2022-04-07

## 2022-04-11 ENCOUNTER — TELEPHONE (OUTPATIENT)
Dept: FAMILY MEDICINE CLINIC | Facility: CLINIC | Age: 42
End: 2022-04-11

## 2022-04-11 RX ORDER — ONDANSETRON 4 MG/1
4 TABLET, ORALLY DISINTEGRATING ORAL EVERY 8 HOURS PRN
Qty: 10 TABLET | Refills: 0 | Status: SHIPPED | OUTPATIENT
Start: 2022-04-11

## 2022-04-11 NOTE — TELEPHONE ENCOUNTER
Pt with c/o of severe nausea that last about 3 hrs after taking the Metronidazole pill. States it happens with every dose and she has been taking the medication with food. to wants to know if ok to continue medication and if so can she have something for nausea?    Sent to provider for review and advice

## 2022-04-11 NOTE — TELEPHONE ENCOUNTER
Hopefully she only has a few doses remaining. I recommend she take that evening dose at bedtime, so that she doesn't have to feel the nausea. Rx for Zofran sent to pharmacy. Thanks.

## 2022-04-12 ENCOUNTER — APPOINTMENT (OUTPATIENT)
Dept: PHYSICAL THERAPY | Facility: HOSPITAL | Age: 42
End: 2022-04-12
Attending: FAMILY MEDICINE
Payer: COMMERCIAL

## 2022-04-13 ENCOUNTER — TELEPHONE (OUTPATIENT)
Dept: PHYSICAL THERAPY | Facility: HOSPITAL | Age: 42
End: 2022-04-13

## 2022-04-13 NOTE — TELEPHONE ENCOUNTER
Called patient to confirm appointment Thursday 4/14 after two late cancels, patient requested to cancel all remaining appointments due to a family medical emergency, she will call back when she is ready to return to therapy.

## 2022-04-14 ENCOUNTER — APPOINTMENT (OUTPATIENT)
Dept: PHYSICAL THERAPY | Facility: HOSPITAL | Age: 42
End: 2022-04-14
Attending: FAMILY MEDICINE
Payer: COMMERCIAL

## 2022-04-18 ENCOUNTER — APPOINTMENT (OUTPATIENT)
Dept: PHYSICAL THERAPY | Facility: HOSPITAL | Age: 42
End: 2022-04-18
Attending: FAMILY MEDICINE
Payer: COMMERCIAL

## 2022-04-22 ENCOUNTER — TELEPHONE (OUTPATIENT)
Dept: FAMILY MEDICINE CLINIC | Facility: CLINIC | Age: 42
End: 2022-04-22

## 2022-04-25 ENCOUNTER — APPOINTMENT (OUTPATIENT)
Dept: PHYSICAL THERAPY | Facility: HOSPITAL | Age: 42
End: 2022-04-25
Attending: FAMILY MEDICINE
Payer: COMMERCIAL

## 2022-09-09 ENCOUNTER — OFFICE VISIT (OUTPATIENT)
Dept: FAMILY MEDICINE CLINIC | Facility: CLINIC | Age: 42
End: 2022-09-09
Payer: COMMERCIAL

## 2022-09-09 VITALS
HEART RATE: 83 BPM | RESPIRATION RATE: 18 BRPM | BODY MASS INDEX: 31.4 KG/M2 | HEIGHT: 62.52 IN | WEIGHT: 175 LBS | OXYGEN SATURATION: 98 % | DIASTOLIC BLOOD PRESSURE: 70 MMHG | SYSTOLIC BLOOD PRESSURE: 116 MMHG | TEMPERATURE: 97 F

## 2022-09-09 DIAGNOSIS — Z13.21 SCREENING FOR ENDOCRINE, NUTRITIONAL, METABOLIC AND IMMUNITY DISORDER: ICD-10-CM

## 2022-09-09 DIAGNOSIS — Z12.4 SCREENING FOR CERVICAL CANCER: ICD-10-CM

## 2022-09-09 DIAGNOSIS — N77.1 VAGINITIS, VULVITIS AND VULVOVAGINITIS IN DISEASES CLASSIFIED ELSEWHERE: ICD-10-CM

## 2022-09-09 DIAGNOSIS — Z13.0 SCREENING FOR ENDOCRINE, NUTRITIONAL, METABOLIC AND IMMUNITY DISORDER: ICD-10-CM

## 2022-09-09 DIAGNOSIS — E78.5 DYSLIPIDEMIA (HIGH LDL; LOW HDL): ICD-10-CM

## 2022-09-09 DIAGNOSIS — Z13.29 SCREENING FOR ENDOCRINE, NUTRITIONAL, METABOLIC AND IMMUNITY DISORDER: ICD-10-CM

## 2022-09-09 DIAGNOSIS — N94.10 DYSPAREUNIA IN FEMALE: ICD-10-CM

## 2022-09-09 DIAGNOSIS — B37.9 CANDIDIASIS: ICD-10-CM

## 2022-09-09 DIAGNOSIS — Z13.228 SCREENING FOR ENDOCRINE, NUTRITIONAL, METABOLIC AND IMMUNITY DISORDER: ICD-10-CM

## 2022-09-09 DIAGNOSIS — Z12.31 BREAST CANCER SCREENING BY MAMMOGRAM: ICD-10-CM

## 2022-09-09 DIAGNOSIS — Z00.00 ANNUAL PHYSICAL EXAM: Primary | ICD-10-CM

## 2022-09-09 DIAGNOSIS — Z11.3 SCREEN FOR STD (SEXUALLY TRANSMITTED DISEASE): ICD-10-CM

## 2022-09-09 PROCEDURE — 87591 N.GONORRHOEAE DNA AMP PROB: CPT | Performed by: FAMILY MEDICINE

## 2022-09-09 PROCEDURE — 87491 CHLMYD TRACH DNA AMP PROBE: CPT | Performed by: FAMILY MEDICINE

## 2022-09-09 PROCEDURE — 99396 PREV VISIT EST AGE 40-64: CPT | Performed by: FAMILY MEDICINE

## 2022-09-09 PROCEDURE — 87624 HPV HI-RISK TYP POOLED RSLT: CPT | Performed by: FAMILY MEDICINE

## 2022-09-09 PROCEDURE — 3078F DIAST BP <80 MM HG: CPT | Performed by: FAMILY MEDICINE

## 2022-09-09 PROCEDURE — 3008F BODY MASS INDEX DOCD: CPT | Performed by: FAMILY MEDICINE

## 2022-09-09 PROCEDURE — 3074F SYST BP LT 130 MM HG: CPT | Performed by: FAMILY MEDICINE

## 2022-09-09 RX ORDER — METRONIDAZOLE 7.5 MG/G
GEL VAGINAL
COMMUNITY
Start: 2022-05-24

## 2022-09-09 RX ORDER — CLOTRIMAZOLE 1 %
CREAM (GRAM) TOPICAL
COMMUNITY
Start: 2022-08-25

## 2022-09-09 RX ORDER — IBREXAFUNGERP 150 MG/1
2 TABLET, FILM COATED ORAL EVERY 12 HOURS
COMMUNITY
Start: 2022-05-23 | End: 2022-09-09

## 2022-09-09 RX ORDER — CLOTRIMAZOLE AND BETAMETHASONE DIPROPIONATE 10; .64 MG/G; MG/G
1 CREAM TOPICAL 2 TIMES DAILY PRN
Qty: 45 G | Refills: 0 | Status: SHIPPED | OUTPATIENT
Start: 2022-09-09

## 2022-09-09 RX ORDER — FLUCONAZOLE 200 MG/1
200 TABLET ORAL DAILY
Qty: 3 TABLET | Refills: 0 | Status: SHIPPED | OUTPATIENT
Start: 2022-09-09

## 2022-09-11 PROBLEM — B37.9 CANDIDIASIS: Status: ACTIVE | Noted: 2022-09-11

## 2022-09-11 PROBLEM — N94.10 DYSPAREUNIA IN FEMALE: Status: ACTIVE | Noted: 2022-09-11

## 2022-09-11 PROBLEM — N77.1 VAGINITIS, VULVITIS AND VULVOVAGINITIS IN DISEASES CLASSIFIED ELSEWHERE: Status: ACTIVE | Noted: 2022-09-11

## 2022-09-12 LAB
C TRACH DNA SPEC QL NAA+PROBE: NEGATIVE
HPV I/H RISK 1 DNA SPEC QL NAA+PROBE: NEGATIVE
N GONORRHOEA DNA SPEC QL NAA+PROBE: NEGATIVE

## 2022-09-21 LAB
HSV 1 IGG TYPE SPECIFIC$AB: <0.9 INDEX
HSV 2 IGG TYPE SPECIFIC$AB: <0.9 INDEX
HSV 2 IGG, HERPESELECT$TYPE SPECIFIC AB: <0.9 INDEX

## 2022-09-23 LAB — HPV I/H RISK 1 DNA SPEC QL NAA+PROBE: NEGATIVE

## 2022-09-26 ENCOUNTER — TELEPHONE (OUTPATIENT)
Dept: FAMILY MEDICINE CLINIC | Facility: CLINIC | Age: 42
End: 2022-09-26

## 2022-09-26 LAB
LAST PAP RESULT: NORMAL
PAP HISTORY (OTHER THAN LAST PAP): NORMAL

## 2022-09-26 NOTE — TELEPHONE ENCOUNTER
Spoke to pt informed labs completed 09/20/2022 do not include glucose level. Pt has other pending labs that need to be completed fasting and do include a glucose level.   Pt offered nurse visit for labs but declined provided with central scheduling number to schedule labs at other [facility] lab

## 2022-09-30 ENCOUNTER — LAB ENCOUNTER (OUTPATIENT)
Dept: LAB | Facility: HOSPITAL | Age: 42
End: 2022-09-30
Attending: FAMILY MEDICINE
Payer: COMMERCIAL

## 2022-09-30 ENCOUNTER — TELEPHONE (OUTPATIENT)
Dept: FAMILY MEDICINE CLINIC | Facility: CLINIC | Age: 42
End: 2022-09-30

## 2022-09-30 DIAGNOSIS — Z13.228 SCREENING FOR ENDOCRINE, NUTRITIONAL, METABOLIC AND IMMUNITY DISORDER: ICD-10-CM

## 2022-09-30 DIAGNOSIS — Z11.3 SCREEN FOR STD (SEXUALLY TRANSMITTED DISEASE): ICD-10-CM

## 2022-09-30 DIAGNOSIS — Z13.29 SCREENING FOR ENDOCRINE, NUTRITIONAL, METABOLIC AND IMMUNITY DISORDER: ICD-10-CM

## 2022-09-30 DIAGNOSIS — Z00.00 ANNUAL PHYSICAL EXAM: ICD-10-CM

## 2022-09-30 DIAGNOSIS — Z13.21 SCREENING FOR ENDOCRINE, NUTRITIONAL, METABOLIC AND IMMUNITY DISORDER: ICD-10-CM

## 2022-09-30 DIAGNOSIS — E78.5 DYSLIPIDEMIA (HIGH LDL; LOW HDL): ICD-10-CM

## 2022-09-30 DIAGNOSIS — Z13.0 SCREENING FOR ENDOCRINE, NUTRITIONAL, METABOLIC AND IMMUNITY DISORDER: ICD-10-CM

## 2022-09-30 LAB
ALBUMIN SERPL-MCNC: 3.6 G/DL (ref 3.4–5)
ALBUMIN/GLOB SERPL: 0.9 {RATIO} (ref 1–2)
ALP LIVER SERPL-CCNC: 58 U/L
ALT SERPL-CCNC: 17 U/L
ANION GAP SERPL CALC-SCNC: 5 MMOL/L (ref 0–18)
AST SERPL-CCNC: 11 U/L (ref 15–37)
BASOPHILS # BLD AUTO: 0.04 X10(3) UL (ref 0–0.2)
BASOPHILS NFR BLD AUTO: 0.6 %
BILIRUB SERPL-MCNC: 0.7 MG/DL (ref 0.1–2)
BUN BLD-MCNC: 18 MG/DL (ref 7–18)
CALCIUM BLD-MCNC: 9.4 MG/DL (ref 8.5–10.1)
CHLORIDE SERPL-SCNC: 106 MMOL/L (ref 98–112)
CHOLEST SERPL-MCNC: 199 MG/DL (ref ?–200)
CO2 SERPL-SCNC: 27 MMOL/L (ref 21–32)
CREAT BLD-MCNC: 0.83 MG/DL
EOSINOPHIL # BLD AUTO: 0.1 X10(3) UL (ref 0–0.7)
EOSINOPHIL NFR BLD AUTO: 1.4 %
ERYTHROCYTE [DISTWIDTH] IN BLOOD BY AUTOMATED COUNT: 12.2 %
FASTING PATIENT LIPID ANSWER: YES
FASTING STATUS PATIENT QL REPORTED: YES
GFR SERPLBLD BASED ON 1.73 SQ M-ARVRAT: 90 ML/MIN/1.73M2 (ref 60–?)
GLOBULIN PLAS-MCNC: 4.1 G/DL (ref 2.8–4.4)
GLUCOSE BLD-MCNC: 90 MG/DL (ref 70–99)
HCT VFR BLD AUTO: 40 %
HDLC SERPL-MCNC: 53 MG/DL (ref 40–59)
HGB BLD-MCNC: 13.1 G/DL
IMM GRANULOCYTES # BLD AUTO: 0.02 X10(3) UL (ref 0–1)
IMM GRANULOCYTES NFR BLD: 0.3 %
LDLC SERPL CALC-MCNC: 136 MG/DL (ref ?–100)
LYMPHOCYTES # BLD AUTO: 2.74 X10(3) UL (ref 1–4)
LYMPHOCYTES NFR BLD AUTO: 39.4 %
MCH RBC QN AUTO: 29.4 PG (ref 26–34)
MCHC RBC AUTO-ENTMCNC: 32.8 G/DL (ref 31–37)
MCV RBC AUTO: 89.7 FL
MONOCYTES # BLD AUTO: 0.45 X10(3) UL (ref 0.1–1)
MONOCYTES NFR BLD AUTO: 6.5 %
NEUTROPHILS # BLD AUTO: 3.6 X10 (3) UL (ref 1.5–7.7)
NEUTROPHILS # BLD AUTO: 3.6 X10(3) UL (ref 1.5–7.7)
NEUTROPHILS NFR BLD AUTO: 51.8 %
NONHDLC SERPL-MCNC: 146 MG/DL (ref ?–130)
OSMOLALITY SERPL CALC.SUM OF ELEC: 287 MOSM/KG (ref 275–295)
PLATELET # BLD AUTO: 230 10(3)UL (ref 150–450)
POTASSIUM SERPL-SCNC: 4 MMOL/L (ref 3.5–5.1)
PROT SERPL-MCNC: 7.7 G/DL (ref 6.4–8.2)
RBC # BLD AUTO: 4.46 X10(6)UL
SODIUM SERPL-SCNC: 138 MMOL/L (ref 136–145)
T PALLIDUM AB SER QL IA: NONREACTIVE
TRIGL SERPL-MCNC: 53 MG/DL (ref 30–149)
TSI SER-ACNC: 2.43 MIU/ML (ref 0.36–3.74)
VLDLC SERPL CALC-MCNC: 10 MG/DL (ref 0–30)
WBC # BLD AUTO: 7 X10(3) UL (ref 4–11)

## 2022-09-30 PROCEDURE — 36415 COLL VENOUS BLD VENIPUNCTURE: CPT

## 2022-09-30 PROCEDURE — 84443 ASSAY THYROID STIM HORMONE: CPT

## 2022-09-30 PROCEDURE — 80053 COMPREHEN METABOLIC PANEL: CPT

## 2022-09-30 PROCEDURE — 86780 TREPONEMA PALLIDUM: CPT

## 2022-09-30 PROCEDURE — 85025 COMPLETE CBC W/AUTO DIFF WBC: CPT

## 2022-09-30 PROCEDURE — 80061 LIPID PANEL: CPT

## 2022-09-30 NOTE — TELEPHONE ENCOUNTER
Spoke to pt, pt states he has noticed her HR going up to the 90s even when she is resting and she can sometimes feel this palpitations. States her resting HR is typically 70s. She monitors her HR on her watch and states its always in normal range but concerned rate increases even when she is resting. She denies any chest pain, arm pain, neck/jaw stiffness, no nausea, and no difficulty breathing. No history of heart issues or hypertension     Pt scheduled for apt 10/28/2022 for evaluation, aware to go to ER if she develops any of the above symptoms.  Pt VU

## 2022-10-01 NOTE — PROGRESS NOTES
Horace notified:  See my chart message to patient. The patient's ASCVD 10 year risk score is 0.6. Dear Adam Blackmon,  Your labs are normal except your lipid panel is elevated. 10-year risk of heart attack per ASCVD score is 0.6, which is low. Treatment for mild to moderate abnormal cholesterol and lipids is best managed  initially with lifestyle changes, improving diet and increasing physical activity. Recommendations for exercise are 3-5 times weekly for 30-60 minutes for a minimum of 150-300 minutes. This will improve your cholesterol levels and strengthen your heart. If you are overweight, then losing weight may also improve your lipid profile. The Mediterranean diet is recommended and contains foods high in omega-3's and alpha linoleic acid; this helps improve your good cholesterol and may lower bad cholesterol. Eat salmon 2x weekly, consume moderate vegetables,lean meats/fish, nuts and healthy fats i.e. almonds, walnuts, flaxseed, hempseed, avocados, avocado or olive oil, spinach, green beans, organic strawberries, etc. Please avoid fried food or limit to 1 x monthly along with pizza and other foods that are high in saturated animal fats. Monitoring your cholesterol and lipid profile is recommended annually sooner as as directed by your doctor. Please call our office  if you have any further question or schedule an appointment.     Sincerely,  Helga Munroe

## 2022-10-14 ENCOUNTER — HOSPITAL ENCOUNTER (OUTPATIENT)
Dept: MAMMOGRAPHY | Facility: HOSPITAL | Age: 42
Discharge: HOME | End: 2022-10-14
Attending: FAMILY MEDICINE
Payer: COMMERCIAL

## 2022-10-14 DIAGNOSIS — Z00.00 ANNUAL PHYSICAL EXAM: ICD-10-CM

## 2022-10-14 DIAGNOSIS — Z12.31 BREAST CANCER SCREENING BY MAMMOGRAM: ICD-10-CM

## 2022-10-14 PROCEDURE — 77067 SCR MAMMO BI INCL CAD: CPT | Performed by: FAMILY MEDICINE

## 2022-10-14 PROCEDURE — 77063 BREAST TOMOSYNTHESIS BI: CPT | Performed by: FAMILY MEDICINE

## 2022-10-19 DIAGNOSIS — R92.2 DENSE BREAST TISSUE ON MAMMOGRAM: Primary | ICD-10-CM

## 2022-10-19 PROBLEM — R92.30 DENSE BREAST TISSUE ON MAMMOGRAM: Status: ACTIVE | Noted: 2022-10-19

## 2022-10-25 DIAGNOSIS — N77.1 VAGINITIS, VULVITIS AND VULVOVAGINITIS IN DISEASES CLASSIFIED ELSEWHERE: ICD-10-CM

## 2022-10-25 DIAGNOSIS — B37.9 CANDIDIASIS: ICD-10-CM

## 2022-10-25 RX ORDER — METRONIDAZOLE 7.5 MG/G
GEL VAGINAL
Refills: 0 | Status: CANCELLED | OUTPATIENT
Start: 2022-10-25

## 2022-10-25 NOTE — TELEPHONE ENCOUNTER
LMOM to return call to the office. Provided pt office phone [phone] along with office hours. Need to inform patient exam required if patient experiencing symptoms that require antibiotics.      Patient has upcoming appt on   Future Appointments   Date Time Provider Peg Hagen   10/28/2022 10:00 AM Liz Rabago DO [facility] [place]

## 2022-10-25 NOTE — TELEPHONE ENCOUNTER
Patient would like a refill on her yeast medication since she can not get in this is a reoccurring thing she says.

## 2022-10-28 ENCOUNTER — OFFICE VISIT (OUTPATIENT)
Dept: FAMILY MEDICINE CLINIC | Facility: CLINIC | Age: 42
End: 2022-10-28
Payer: COMMERCIAL

## 2022-10-28 VITALS
HEIGHT: 62 IN | BODY MASS INDEX: 32.79 KG/M2 | SYSTOLIC BLOOD PRESSURE: 100 MMHG | WEIGHT: 178.19 LBS | TEMPERATURE: 97 F | OXYGEN SATURATION: 99 % | RESPIRATION RATE: 20 BRPM | DIASTOLIC BLOOD PRESSURE: 70 MMHG | HEART RATE: 81 BPM

## 2022-10-28 DIAGNOSIS — N76.0 RECURRENT VAGINITIS: ICD-10-CM

## 2022-10-28 DIAGNOSIS — Z23 NEED FOR VACCINATION: ICD-10-CM

## 2022-10-28 DIAGNOSIS — B37.9 YEAST INFECTION: Primary | ICD-10-CM

## 2022-10-28 DIAGNOSIS — R00.2 PALPITATIONS: ICD-10-CM

## 2022-10-28 PROCEDURE — 3078F DIAST BP <80 MM HG: CPT | Performed by: FAMILY MEDICINE

## 2022-10-28 PROCEDURE — 3008F BODY MASS INDEX DOCD: CPT | Performed by: FAMILY MEDICINE

## 2022-10-28 PROCEDURE — 99214 OFFICE O/P EST MOD 30 MIN: CPT | Performed by: FAMILY MEDICINE

## 2022-10-28 PROCEDURE — 93000 ELECTROCARDIOGRAM COMPLETE: CPT | Performed by: FAMILY MEDICINE

## 2022-10-28 PROCEDURE — 87480 CANDIDA DNA DIR PROBE: CPT | Performed by: FAMILY MEDICINE

## 2022-10-28 PROCEDURE — 90686 IIV4 VACC NO PRSV 0.5 ML IM: CPT | Performed by: FAMILY MEDICINE

## 2022-10-28 PROCEDURE — 87510 GARDNER VAG DNA DIR PROBE: CPT | Performed by: FAMILY MEDICINE

## 2022-10-28 PROCEDURE — 3074F SYST BP LT 130 MM HG: CPT | Performed by: FAMILY MEDICINE

## 2022-10-28 PROCEDURE — 87660 TRICHOMONAS VAGIN DIR PROBE: CPT | Performed by: FAMILY MEDICINE

## 2022-10-28 PROCEDURE — 90471 IMMUNIZATION ADMIN: CPT | Performed by: FAMILY MEDICINE

## 2022-10-28 RX ORDER — FLUCONAZOLE 200 MG/1
200 TABLET ORAL EVERY OTHER DAY
Qty: 3 TABLET | Refills: 0 | Status: SHIPPED | OUTPATIENT
Start: 2022-10-28

## 2022-10-28 RX ORDER — SACCHAROMYCES BOULARDII 250 MG
250 CAPSULE ORAL 2 TIMES DAILY
Qty: 60 CAPSULE | Refills: 3 | Status: SHIPPED | OUTPATIENT
Start: 2022-10-28

## 2022-10-28 RX ORDER — LEVONORGESTREL 52 MG/1
1 INTRAUTERINE DEVICE INTRAUTERINE ONCE
COMMUNITY

## 2022-10-28 RX ORDER — METRONIDAZOLE 7.5 MG/G
GEL VAGINAL
Qty: 70 G | Refills: 5 | Status: SHIPPED | OUTPATIENT
Start: 2022-10-28

## 2022-10-31 LAB
ATRIAL RATE: 66 BPM
P AXIS: 42 DEGREES
P-R INTERVAL: 150 MS
Q-T INTERVAL: 398 MS
QRS DURATION: 80 MS
QTC CALCULATION (BEZET): 417 MS
R AXIS: 32 DEGREES
T AXIS: 46 DEGREES
VENTRICULAR RATE: 66 BPM

## 2022-11-01 ENCOUNTER — TELEPHONE (OUTPATIENT)
Dept: FAMILY MEDICINE CLINIC | Facility: CLINIC | Age: 42
End: 2022-11-01

## 2022-11-01 NOTE — TELEPHONE ENCOUNTER
----- Message from Estelita Pillai DO sent at 10/31/2022 12:29 AM CDT -----  Results reviewed. Released to [address] Will discuss with patient at next visit. Ramon Grider,  I have reviewed your test results. You are positive for both bacterial vaginosis and yeast.  Please take the Diflucan/fluconazole as prescribed and start the metronidazole cream.  Please let me know if you have any questions.   Sincerely,  Estelita Pillai DO

## 2022-11-01 NOTE — TELEPHONE ENCOUNTER
Left detailed message informing patient of results and recommendations by Angeles Bernal DO. Advised patient to contact the office with questions and to review mychart with results. Provided pt office phone [phone] along with office hours.

## 2022-11-03 ENCOUNTER — TELEPHONE (OUTPATIENT)
Dept: FAMILY MEDICINE CLINIC | Facility: CLINIC | Age: 42
End: 2022-11-03

## 2022-11-18 NOTE — TELEPHONE ENCOUNTER
Per patient is taking medication currently and feels better. Patient was not able to give me more information due to her not being able to talk on the phone.

## 2023-05-13 ENCOUNTER — OFFICE VISIT (OUTPATIENT)
Dept: FAMILY MEDICINE CLINIC | Facility: CLINIC | Age: 43
End: 2023-05-13
Payer: COMMERCIAL

## 2023-05-13 VITALS
DIASTOLIC BLOOD PRESSURE: 64 MMHG | WEIGHT: 171 LBS | HEIGHT: 62 IN | RESPIRATION RATE: 20 BRPM | HEART RATE: 84 BPM | OXYGEN SATURATION: 100 % | BODY MASS INDEX: 31.47 KG/M2 | SYSTOLIC BLOOD PRESSURE: 108 MMHG | TEMPERATURE: 97 F

## 2023-05-13 DIAGNOSIS — R42 DIZZINESS: Primary | ICD-10-CM

## 2023-05-13 DIAGNOSIS — H69.83 EUSTACHIAN TUBE DYSFUNCTION, BILATERAL: ICD-10-CM

## 2023-05-13 PROCEDURE — 3008F BODY MASS INDEX DOCD: CPT | Performed by: FAMILY MEDICINE

## 2023-05-13 PROCEDURE — 3078F DIAST BP <80 MM HG: CPT | Performed by: FAMILY MEDICINE

## 2023-05-13 PROCEDURE — 99213 OFFICE O/P EST LOW 20 MIN: CPT | Performed by: FAMILY MEDICINE

## 2023-05-13 PROCEDURE — 3074F SYST BP LT 130 MM HG: CPT | Performed by: FAMILY MEDICINE

## 2023-05-25 ENCOUNTER — TELEPHONE (OUTPATIENT)
Dept: PHYSICAL THERAPY | Facility: HOSPITAL | Age: 43
End: 2023-05-25

## 2023-05-29 NOTE — PROGRESS NOTES
VESTIBULAR EVALUATION:     Diagnosis:   Dizziness (R42)  Eustachian tube dysfunction, bilateral (X96.34)        Referring Provider: David Cobb  Date of Evaluation:    5/30/2023    Precautions:  Latex allergy Next MD visit:   none scheduled  Date of Surgery: n/a     PATIENT SUMMARY   Dina Francisco is a 38year old female who presents to therapy today with reports of dizziness that started approx 3 weeks ago, first noted bending over. A few years ago reports hx of BPPV that resolved with PT. Currently not the severe spinning sensation she had then but will feel off balance. Also notes when her neck pain is worse, more dizziness. Hx of sinus issues, weather changes also notes the dizziness. . Followed up with PCP and was advised to return if symptoms worsen. Past PT for neck pain. Prior X rays with degenerative changes noted. Overall dizziness getting better, and reports improved neck pain over the weekend, resumed exercise and did a work out this morning; increased neck pain this afternoon. Reports she has been avoiding exercises that involve the neck. Reports doing push ups, burpees, bicep curls today. Avoiding overhead exercises. .   No c/o N/T. On and off neck pain over the years, DDD. Therapy in the past has helped. Invested in standing desk, ergo chair. Neck position can trigger the dizziness. Aleve has helped with the pain and dizziness. Balance feels off    Symptoms with cough/sneeze or loud noise:none No  Falls: no  Hx of migraines: no.    HA dull back of head, sinus head ache hx  Hx of vision issue:no double vision. Computer work difficult at times. Recent vision exam Dec 2022     Hx of hearing issues:  No.    Reports chronic ringing L ear in winter/cold weather, wears ear muffs for warmth which helps    Dizziness: intermittent. improving  Increased dizziness with increased neck pain   Quality: imbalance, off.   Reports no severe spinning like with BPPV several years ago  Aggravates: Rolling, Bending over, Quick head movements, Turning/direction changes, Shopping and Computer work  Relieves: Not moving. Aleve    Headache: hx of sinus HA's. C/o back of head/neck pain. Cervical pain: yes. Current 6/10, Best 2/10, Worst 7/10  Aggravates: Reading/computer work, Household chores and Work activities     Dizziness Handicap Inventory [facility]): 36 mod handicap     Current functional limitations include difficulty with quicker head movements, changes of position, bending over tasks/chores, turning over in bed. Social history:  Family, computer work. Marni describes prior level of function independent  Pt goals include  Get rid of the dizziness, more comfortable. Able to exercise and know what to do and not to do to aggravated the neck pain. Past medical history was reviewed with Marni. Significant findings include cervical DDD/radiculopathy, OA, tension HA/sinus issues     ASSESSMENT  Marni presents to physical therapy evaluation with primary c/o dizziness and imbalance for the past 3 weeks. The results of the objective tests and measures show (+) cervical musculature tenderness, painful ROM, decreased postural control. Pt also notes the correlation between neck pain and dizziness complaints. Overall symptoms improving. Functional deficits include but are not limited to dizziness symptoms provoked with quicker head movements, changes of position, computer work, and with neck pain. Signs and symptoms are consistent with diagnosis of cervicogenic dizziness and cervical DDD. Hx of BPPV years ago that had completely resolved. Today testing is (-) for BPPV; will cont to assess as indicated. Pt and PT discussed evaluation findings, pathology, POC and HEP. Pt voiced understanding and performs HEP correctly. Skilled Physical Therapy is medically necessary to address the above impairments and reach functional goals.     OBJECTIVE:   Physical Exam:  Posture/Observation: no acute distress   Neuro Screen: Sensation: no c/o N/T    Cervical spine ROM: dizziness with L rot. Central neck pain today, lower cervical.                                   Hx of L radiating UE symptoms; no UE symptoms today                                 FlexWFL's, Ext 35 deg, R ROT 70, L ROT 70   Adverse neuro signs with ROM: no    Occulomotor & Vestibulo-Ocular Exam:  Spontaneous Nystagmus: room light: none ;  fixation blocked: TBA  Smooth Pursuit: Negative  Saccades: Negative  Gaze Evoked Nystagmus:  room light: Negative; Head Thrust: TBA    Positional Testing:   Wendy-Hallpike: R (-), L (-)   Roll Test [facility]): (-)     Postural Control:   Romberg: EO 30 sec, EC 30 sec with increased sway  Tandem Stance: limited 2-3 sec    Functional Mobility:   Gait: pt ambulates on level ground (I)    Today's Treatment and Response:   Pt education was provided on exam findings, treatment diagnosis, treatment plan, expectations, and prognosis. Pt was also provided recommendations for activity modifications, postural corrections, ergonomics and pain science education, vestibular rehab plan of care, possible causes of dizziness/variables affecting dizziness, coping strategies, habituation guidelines, safety awareness. Pt workout this am included burpees, push ups; increased neck pain today. Discussed hold these higher level exercises for now, limit overhead activities for now. Instead wall push up/keep painfree neck. Patient was instructed in and issued a HEP for:   Grounding principles. Standing balance; adding 5 -10 reps slow head turns with grounding. Ease back into head nod/chin tuck. Added supine head nod. Cervical rot painfree range 5x each, retro scap roll    HOME EXERCISE PROGRAM [[id number]]  QR code in Pt Instructions    COMMENT: Practice grounding principles<br>  Ease back into head nod/chin tuck.  Sitting or laying down with pillow support. <br>  Continue with your good posture awareness and ergonomics. <br>  Avoid workouts that aggravate neck/dizziness symptoms. Avoid burpees, full push ups for now. You may begin with painfree wall push up. RHOMBERG STANCE -  Duration 30 Seconds, Complete 2 Sets, Perform 2 Times a Day    RETRACTION / CHIN TUCK -  Repeat 5 Times, Hold 5 Seconds, Complete 1 Set, Perform 1 Times a Day    CERVICAL CHIN TUCK/HEAD NOD  - SUPINE WITH TOWEL -  Repeat 5 Times, Hold 5 Seconds, Complete 1 Set, Perform 1 Times a Day    DOORWAY STRETCH -  Repeat 3 Times, Hold 30 Seconds, Complete 1 Set, Perform 1 Times a Day    WALL PUSH UPS -  Repeat 10 Times, Hold 3 Seconds, Complete 2 Sets, Perform 1 Times a Day    Charges: PT Eval Moderate Complexity, TE      Total Timed Treatment: 10 min     Total Treatment Time: 50 min   Based on the clinical presentation, examination and history, this evaluation shows involvement of 3-4 body structures involved / activity limitations, 1-2 personal factors / comorbidities and the condition is evolving due to changing clinical characteristics indicating moderate complexity. PLAN OF CARE:    Goals: (to be met in 8 visits)  . Independent with HEP including safe return to gym program.  bilat cervical rot at least 70 deg without c/o pain or dizziness. Able to bend to floor for household chores without symptoms of dizziness   (I) stand eyes closed with narrow base of support and head movement for shower safety. Able to tolerate grocery shopping/errands 30 mins without aggravation of dizziness  Pt to report ability to turn over in bed, transfer without dizziness. Frequency / Duration: Patient will be seen for 1-2 x/week or a total of 8 visits over a 90 day period. Treatment will include: home exercise program development and instruction, patient/family education, balance training, canalith repositioning maneuver, eye/head coordination exercises, sensory organization training, ROM, manual therapy and strengthening.      Education or treatment limitation: None   Rehab Potential: good     Patient/Family/Caregiver was advised of these findings, precautions, and treatment options and has agreed to actively participate in planning and for this course of care. Thank you for your referral. Please co-sign or sign and return this letter via fax as soon as possible to [phone]. If you have any questions, please contact me at Dept: [phone]    Sincerely,  Electronically signed by therapist: Wayne Maloney, PT  [de-identified] certification required: Yes  I certify the need for these services furnished under this plan of treatment and while under my care.     X___________________________________________________ Date____________________    Certification From: 3/04/5084  To:8/27/2023

## 2023-05-30 ENCOUNTER — OFFICE VISIT (OUTPATIENT)
Dept: PHYSICAL THERAPY | Age: 43
End: 2023-05-30
Attending: FAMILY MEDICINE
Payer: COMMERCIAL

## 2023-05-30 DIAGNOSIS — R42 DIZZINESS: ICD-10-CM

## 2023-05-30 DIAGNOSIS — H69.83 EUSTACHIAN TUBE DYSFUNCTION, BILATERAL: ICD-10-CM

## 2023-05-30 PROCEDURE — 97162 PT EVAL MOD COMPLEX 30 MIN: CPT

## 2023-05-30 PROCEDURE — 97110 THERAPEUTIC EXERCISES: CPT

## 2023-06-07 NOTE — PROGRESS NOTES
Diagnosis:   Dizziness (R42)  Eustachian tube dysfunction, bilateral (A17.33)        Referring Provider: Carmelina Lesch  Date of Evaluation:    5/30/23    Precautions:  Latex Allergy Next MD visit:   none scheduled  Date of Surgery: n/a   Insurance Primary/Secondary: [address] [place] [address] / N/A     # Auth Visits: 8            Subjective: \"pain has gone down considerably\". Reports increased pain with moving the neck too much and certain positioning. Dizziness much improved; has not had any significant dizzy episodes. Pain: 5/10 neck   eval: Current 6/10, Best 2/10, Worst 7/10     Objective:   Cervical ROM:  Pain all directions, end ranges                         bilat rot 60 deg                         bilat SB 20 deg  VOR head thrust:  Appears (-) bilat. pt guarded with difficulty relaxing, improved with practice  Myotomes strong/equal bilat UE's  Positional Testing:   Wendy-Hallpike: R (-), L (-)   Roll Test [facility]): (-)      [facility]): 36 mod handicap        Assessment: correlated neck pain and dizziness complaints; both are subsiding. Pt is gradually returning to more normal neck movements. Able to advance postural control today with imbalance; without c/o dizziness. Goals:   (to be met in 8 visits)  . Independent with HEP including safe return to gym program.  bilat cervical rot at least 70 deg without c/o pain or dizziness. Able to bend to floor for household chores without symptoms of dizziness   (I) stand eyes closed with narrow base of support and head movement for shower safety. Able to tolerate grocery shopping/errands 30 mins without aggravation of dizziness  Pt to report ability to turn over in bed, transfer without dizziness. Plan: Assess tolerance for increased computer work next week. Initiated strengthening, rows, lat pull. Patient will be seen for 1-2 x/week or a total of 8 visits over a 90 day period.  Treatment will include: home exercise program development and instruction, patient/family education, balance training, canalith repositioning maneuver, eye/head coordination exercises, sensory organization training, ROM, manual therapy and strengthening. Date: 6/8/2023  TX#: 2/8 Date:                 TX#: 3/ Date:                 TX#: 4/ Date:                 TX#: 5/ Date: Tx#: 6/   Reassess  Rhomberg EC 30\"x3       Foam stance --> Rhomberg EC trials  Foam semi tandem L/R 30\" each Latex allergy      Posture review back on wall  Reverse T wall slide 10x       HEP review,  guidelines       HEP: Grounding principles. Standing balance; adding 5 -10 reps slow head turns with grounding. Ease back into head nod/chin tuck. Added supine head nod. Cervical rot painfree range 5x each, retro scap roll     HOME EXERCISE PROGRAM [[id number]]  QR code in Pt Instructions     COMMENT: Practice grounding principles<br>  Ease back into head nod/chin tuck. Sitting or laying down with pillow support. <br>  Continue with your good posture awareness and ergonomics. <br>  Avoid workouts that aggravate neck/dizziness symptoms. Avoid burpees, full push ups for now. You may begin with painfree wall push up.      RHOMBERG STANCE -  Duration 30 Seconds, Complete 2 Sets, Perform 2 Times a Day     RETRACTION / CHIN TUCK -  Repeat 5 Times, Hold 5 Seconds, Complete 1 Set, Perform 1 Times a Day     CERVICAL CHIN TUCK/HEAD NOD  - SUPINE WITH TOWEL -  Repeat 5 Times, Hold 5 Seconds, Complete 1 Set, Perform 1 Times a Day     DOORWAY STRETCH -  Repeat 3 Times, Hold 30 Seconds, Complete 1 Set, Perform 1 Times a Day     WALL PUSH UPS -  Repeat 10 Times, Hold 3 Seconds, Complete 2 Sets, Perform 1 Times a Day      Charges: NR3    Total Timed Treatment: 45 min  Total Treatment Time: 45 min

## 2023-06-08 ENCOUNTER — HOSPITAL ENCOUNTER (OUTPATIENT)
Dept: CV DIAGNOSTICS | Facility: HOSPITAL | Age: 43
Discharge: HOME | End: 2023-06-08
Attending: FAMILY MEDICINE
Payer: COMMERCIAL

## 2023-06-08 ENCOUNTER — OFFICE VISIT (OUTPATIENT)
Dept: PHYSICAL THERAPY | Age: 43
End: 2023-06-08
Attending: FAMILY MEDICINE
Payer: COMMERCIAL

## 2023-06-08 DIAGNOSIS — R00.2 PALPITATIONS: ICD-10-CM

## 2023-06-08 PROCEDURE — 97112 NEUROMUSCULAR REEDUCATION: CPT

## 2023-06-08 PROCEDURE — 93271 ECG/MONITORING AND ANALYSIS: CPT | Performed by: FAMILY MEDICINE

## 2023-06-08 PROCEDURE — 93270 REMOTE 30 DAY ECG REV/REPORT: CPT | Performed by: FAMILY MEDICINE

## 2023-06-15 ENCOUNTER — OFFICE VISIT (OUTPATIENT)
Dept: PHYSICAL THERAPY | Age: 43
End: 2023-06-15
Attending: FAMILY MEDICINE
Payer: COMMERCIAL

## 2023-06-15 PROCEDURE — 97140 MANUAL THERAPY 1/> REGIONS: CPT

## 2023-06-15 PROCEDURE — 97112 NEUROMUSCULAR REEDUCATION: CPT

## 2023-06-15 NOTE — PROGRESS NOTES
Diagnosis:   Dizziness (R42)  Eustachian tube dysfunction, bilateral (A92.96)        Referring Provider: Grupo Samano  Date of Evaluation:    5/30/23    Precautions:  Latex Allergy Next MD visit:   none scheduled  Date of Surgery: n/a   Insurance Primary/Secondary: [address] [place] [address] / N/A     # Auth Visits: 8            Subjective: Returned from trip to [place]. R sided neck pain. 80-90% dizziness improvement, can feel a little off. Plans to start back to work outs tomorrow; will be aware not to overdo. Pain:   4/10     eval: Current 6/10, Best 2/10, Worst 7/10     Objective: Tandem stance L/R 30 sec, increased imbalance with head turns  Cervical ROM:  Pain all directions, end ranges                         bilat rot 60 deg                         bilat SB 20 deg  VOR head thrust:  Appears (-) bilat. pt guarded with difficulty relaxing, improved with practice  Myotomes strong/equal bilat UE's  Positional Testing:   [place]: R (-), L (-)   Roll Test [facility]): (-)      [facility]): 36 mod handicap        Assessment: Dizziness complaints continue to subside. Residual neck pain associated with muscular fatigue but the end of the day as well as morning related stiffness. Goals:   (to be met in 8 visits)  . Independent with HEP including safe return to gym program.  bilat cervical rot at least 70 deg without c/o pain or dizziness. Able to bend to floor for household chores without symptoms of dizziness   (I) stand eyes closed with narrow base of support and head movement for shower safety. Able to tolerate grocery shopping/errands 30 mins without aggravation of dizziness  Pt to report ability to turn over in bed, transfer without dizziness. Plan: HEP advancements next visit. Patient will be seen for 1-2 x/week or a total of 8 visits over a 90 day period.  Treatment will include: home exercise program development and instruction, patient/family education, balance training, canalith repositioning maneuver, eye/head coordination exercises, sensory organization training, ROM, manual therapy and strengthening. Date: 6/8/2023  TX#: 2/8 Date:  6/15/23               TX#: 3/8 Date:                 TX#: 4/ Date:                 TX#: 5/ Date: Tx#: 6/   Reassess  Rhomberg EC 30\"x3 NR 33'      Foam stance --> Rhomberg EC trials  Foam semi tandem L/R 30\" each Foam tandem stance L/R + head turns 10x  Wall slide thoracic ext towel 10x  Reverse T wall slide 10x  Pillow head nod. 10x  Wall push up 10x netural          Posture review back on wall  Reverse T wall slide 10x Manual 12'  Cervical distraction 60\" holds, SOR  pect minor release Latex allergy     HEP review,  guidelines       HEP: Grounding principles. Standing balance; adding 5 -10 reps slow head turns with grounding. Ease back into head nod/chin tuck. Added supine head nod. Cervical rot painfree range 5x each, retro scap roll     HOME EXERCISE PROGRAM [[id number]]  QR code in Pt Instructions     COMMENT: Practice grounding principles<br>  Ease back into head nod/chin tuck. Sitting or laying down with pillow support. <br>  Continue with your good posture awareness and ergonomics. <br>  Avoid workouts that aggravate neck/dizziness symptoms. Avoid burpees, full push ups for now. You may begin with painfree wall push up.      RHOMBERG STANCE -  Duration 30 Seconds, Complete 2 Sets, Perform 2 Times a Day     RETRACTION / CHIN TUCK -  Repeat 5 Times, Hold 5 Seconds, Complete 1 Set, Perform 1 Times a Day     CERVICAL CHIN TUCK/HEAD NOD  - SUPINE WITH TOWEL -  Repeat 5 Times, Hold 5 Seconds, Complete 1 Set, Perform 1 Times a Day     DOORWAY STRETCH -  Repeat 3 Times, Hold 30 Seconds, Complete 1 Set, Perform 1 Times a Day     WALL PUSH UPS -  Repeat 10 Times, Hold 3 Seconds, Complete 2 Sets, Perform 1 Times a Day      Charges: NR2 man x 1    Total Timed Treatment: 45 min  Total Treatment Time: 45 min

## 2023-06-22 ENCOUNTER — OFFICE VISIT (OUTPATIENT)
Dept: PHYSICAL THERAPY | Age: 43
End: 2023-06-22
Attending: FAMILY MEDICINE
Payer: COMMERCIAL

## 2023-06-22 PROCEDURE — 97140 MANUAL THERAPY 1/> REGIONS: CPT

## 2023-06-22 PROCEDURE — 97112 NEUROMUSCULAR REEDUCATION: CPT

## 2023-06-22 NOTE — PROGRESS NOTES
Diagnosis:   Dizziness (R42)  Eustachian tube dysfunction, bilateral (U58.29)        Referring Provider: Grupo Samano  Date of Evaluation:    5/30/23    Precautions:  Latex Allergy Next MD visit:   none scheduled  Date of Surgery: n/a   Insurance Primary/Secondary: [address] [place] [address] / N/A     # Auth Visits: 8            Subjective: No c/o dizziness, has been 100% better lately. Will avoid the awkward neck positioning, which is still an issue but will straighten out and feels better. Work out with  with less pain/soreness than last time, still with the \"tightness\" feeling back of neck 5-6/10 rating. Pain:   4/10     eval: Current 6/10, Best 2/10, Worst 7/10     Objective:   Cervical AROM: no dizziness all directions  ---------------------------  Tandem stance L/R 30 sec, increased imbalance with head turns  Cervical ROM:  Pain all directions, end ranges                         bilat rot 60 deg                         bilat SB 20 deg  VOR head thrust:  Appears (-) bilat. pt guarded with difficulty relaxing, improved with practice  Myotomes strong/equal bilat UE's  Positional Testing:   [place]: R (-), L (-)   Roll [facility]): (-)      [facility]): 36 mod handicap        Assessment: Dizziness complaints have steadily improved. Symptoms consistent with cervicogenic dizziness. Residual neck pain complaints. Pt has benefited from education on resuming workouts with . Progressed cervical head nod to prone options which pt diana well. No dizziness complaints during session. Goals:   (to be met in 8 visits)  . Independent with HEP including safe return to gym program.  bilat cervical rot at least 70 deg without c/o pain or dizziness. Able to bend to floor for household chores without symptoms of dizziness   (I) stand eyes closed with narrow base of support and head movement for shower safety.   Able to tolerate grocery shopping/errands 30 mins without aggravation of dizziness  Pt to report ability to turn over in bed, transfer without dizziness. Plan: HEP advancements next visit. Patient will be seen for 1-2 x/week or a total of 8 visits over a 90 day period. Treatment will include: home exercise program development and instruction, patient/family education, balance training, canalith repositioning maneuver, eye/head coordination exercises, sensory organization training, ROM, manual therapy and strengthening. Date: 6/8/2023  TX#: 2/8 Date:  6/15/23               TX#: 3/8 Date:  6/22/23               TX#: 4/ Date:                 TX#: 5/ Date: Tx#: 6/   Reassess  Rhomberg EC 30\"x3 NR 33' NR     Foam stance --> Rhomberg EC trials  Foam semi tandem L/R 30\" each Foam tandem stance L/R + head turns 10x  Wall slide thoracic ext towel 10x  Reverse T wall slide 10x  Pillow head nod. 10x  Wall push up 10x netural     Cable pull downs 3 plates 26L7  Wall push up 10x2  Neutral spine back on wall  Reverse T wall slide 10x2  Pt ed workout progressions, guidelines, cervical AROM   Supine head nod 10x  Prone UE/LE 10x  Prone SB options LE only --> UE/LE 10x         Posture review back on wall  Reverse T wall slide 10x Manual 12'  Cervical distraction 60\" holds, SOR  pect minor release Latex allergy     HEP review,  guidelines  Manual 12'  Cervical distraction 60\" holds, SOR  pect minor release     HEP: Grounding principles. Standing balance; adding 5 -10 reps slow head turns with grounding. Ease back into head nod/chin tuck. Added supine head nod. Cervical rot painfree range 5x each, retro scap roll     HOME EXERCISE PROGRAM [[id number]]  QR code in Pt Instructions     COMMENT: Practice grounding principles<br>  Ease back into head nod/chin tuck. Sitting or laying down with pillow support. <br>  Continue with your good posture awareness and ergonomics. <br>  Avoid workouts that aggravate neck/dizziness symptoms. Avoid burpees, full push ups for now. You may begin with painfree wall push up. RHOMBERG STANCE -  Duration 30 Seconds, Complete 2 Sets, Perform 2 Times a Day     RETRACTION / CHIN TUCK -  Repeat 5 Times, Hold 5 Seconds, Complete 1 Set, Perform 1 Times a Day     CERVICAL CHIN TUCK/HEAD NOD  - SUPINE WITH TOWEL -  Repeat 5 Times, Hold 5 Seconds, Complete 1 Set, Perform 1 Times a Day     DOORWAY STRETCH -  Repeat 3 Times, Hold 30 Seconds, Complete 1 Set, Perform 1 Times a Day     WALL PUSH UPS -  Repeat 10 Times, Hold 3 Seconds, Complete 2 Sets, Perform 1 Times a Day      Charges: NR2 man x 1    Total Timed Treatment: 45 min  Total Treatment Time: 45 min

## 2023-06-29 ENCOUNTER — OFFICE VISIT (OUTPATIENT)
Dept: PHYSICAL THERAPY | Age: 43
End: 2023-06-29
Attending: FAMILY MEDICINE
Payer: COMMERCIAL

## 2023-06-29 PROCEDURE — 97140 MANUAL THERAPY 1/> REGIONS: CPT

## 2023-06-29 PROCEDURE — 97110 THERAPEUTIC EXERCISES: CPT

## 2023-06-29 PROCEDURE — 97112 NEUROMUSCULAR REEDUCATION: CPT

## 2023-07-06 ENCOUNTER — OFFICE VISIT (OUTPATIENT)
Dept: PHYSICAL THERAPY | Age: 43
End: 2023-07-06
Attending: FAMILY MEDICINE
Payer: COMMERCIAL

## 2023-07-06 PROCEDURE — 97140 MANUAL THERAPY 1/> REGIONS: CPT

## 2023-07-06 PROCEDURE — 97112 NEUROMUSCULAR REEDUCATION: CPT

## 2023-07-06 PROCEDURE — 97110 THERAPEUTIC EXERCISES: CPT

## 2023-07-06 NOTE — PROGRESS NOTES
Diagnosis:   Dizziness (R42)  Eustachian tube dysfunction, bilateral (B38.82)        Referring Provider: Salas Rodriguez  Date of Evaluation:    5/30/23    Precautions:  Latex Allergy Next MD visit:   none scheduled  Date of Surgery: n/a   Insurance Primary/Secondary: [address] [place] [address] / N/A     # Auth Visits: 8            Subjective:  Monday woke up with neck very stiff. No c/o dizziness, has had  \"a little imbalance with quicker movements. \"     Pain:   5-6/10     eval: Current 6/10, Best 2/10, Worst 7/10     Objective: Increased flex CT junction   Deep neck flexor 10 sec  Cervical AROM: no dizziness all directions  ---------------------------  Tandem stance L/R 30 sec, increased imbalance with head turns  Cervical ROM:  Pain all directions, end ranges                         bilat rot 60 deg                         bilat SB 20 deg  VOR head thrust:  Appears (-) bilat. pt guarded with difficulty relaxing, improved with practice  Myotomes strong/equal bilat UE's    Positional Testing:   [place]: R (-), L (-)   Roll Test [facility]): (-)      [facility]): 36 mod handicap        Assessment: Pt gradually returning to work outs  and following through with modified program.     Good relief with SOR. Goals:   (to be met in 8 visits)  . Independent with HEP including safe return to gym program.  bilat cervical rot at least 70 deg without c/o pain or dizziness. Able to bend to floor for household chores without symptoms of dizziness   (I) stand eyes closed with narrow base of support and head movement for shower safety. Able to tolerate grocery shopping/errands 30 mins without aggravation of dizziness  Pt to report ability to turn over in bed, transfer without dizziness. Plan: Upper back/cervical strengthening. Cont with cervical proprioception. Patient will be seen for 1-2 x/week or a total of 8 visits over a 90 day period.  Treatment will include: home exercise program development and instruction, patient/family education, balance training, canalith repositioning maneuver, eye/head coordination exercises, sensory organization training, ROM, manual therapy and strengthening. Date: 6/8/2023  TX#: 2/8 Date:  6/15/23               TX#: 3/8 Date:  6/22/23               TX#: 4/ Date:  6/29/23               TX#: 5/ Date: 7/6/23  Tx#: 6/    Reassess  Rhomberg EC 30\"x3 NR 33' NR TE  UBE  3' back  Doorway stretch for pects TE  UBE  3' back  Doorway stretch for pects    Foam stance --> Rhomberg EC trials  Foam semi tandem L/R 30\" each Foam tandem stance L/R + head turns 10x  Wall slide thoracic ext towel 10x  Reverse T wall slide 10x  Pillow head nod. 10x  Wall push up 10x netural     Cable pull downs 3 plates 28B2  Wall push up 10x2  Neutral spine back on wall  Reverse T wall slide 10x2  Pt ed workout progressions, guidelines, cervical AROM   Supine head nod 10x  Prone UE/LE 10x  Prone SB options LE only --> UE/LE 10x     Foam roll:  -saws 1' each  -pect stretch W and T positions 30\" holds    NR  -foam roll head nod 10x  Supine head nod - pillow  Head nod + rot proprioception 5x each  Supine deep neck flexor place and hold 10\"x3 NR   Posture ed back on wall - neutral  W sets 10x2  Head nod 10x  Deep neck flexor hold 10\" Latex allergy   Posture review back on wall  Reverse T wall slide 10x Manual 12'  Cervical distraction 60\" holds, SOR  pect minor release Latex allergy Manual   Distraction 60\"x3  SOR 60\" x2  Head nod MWM  Manual   Distraction 60\"x3  SOR 60\" x4  UT release L/R    HEP review,  guidelines  Manual 12'  Cervical distraction 60\" holds, SOR  pect minor release      HEP: Grounding principles. Standing balance; adding 5 -10 reps slow head turns with grounding. Ease back into head nod/chin tuck. Added supine head nod.    Cervical rot painfree range 5x each, retro scap roll     HOME EXERCISE PROGRAM [[id number]]  QR code in Pt Instructions     COMMENT: Practice grounding principles<br>  Ease back into head nod/chin tuck. Sitting or laying down with pillow support. <br>  Continue with your good posture awareness and ergonomics. <br>  Avoid workouts that aggravate neck/dizziness symptoms. Avoid burpees, full push ups for now. You may begin with painfree wall push up.      RHOMBERG STANCE -  Duration 30 Seconds, Complete 2 Sets, Perform 2 Times a Day     RETRACTION / CHIN TUCK -  Repeat 5 Times, Hold 5 Seconds, Complete 1 Set, Perform 1 Times a Day     CERVICAL CHIN TUCK/HEAD NOD  - SUPINE WITH TOWEL -  Repeat 5 Times, Hold 5 Seconds, Complete 1 Set, Perform 1 Times a Day     DOORWAY STRETCH -  Repeat 3 Times, Hold 30 Seconds, Complete 1 Set, Perform 1 Times a Day     WALL PUSH UPS -  Repeat 10 Times, Hold 3 Seconds, Complete 2 Sets, Perform 1 Times a Day      Charges: NR x 1  TE x 1 man x 1    Total Timed Treatment: 45 min  Total Treatment Time: 45 min

## 2023-07-11 ENCOUNTER — OFFICE VISIT (OUTPATIENT)
Dept: PHYSICAL THERAPY | Age: 43
End: 2023-07-11
Attending: FAMILY MEDICINE
Payer: COMMERCIAL

## 2023-07-11 PROCEDURE — 97110 THERAPEUTIC EXERCISES: CPT

## 2023-07-11 PROCEDURE — 97140 MANUAL THERAPY 1/> REGIONS: CPT

## 2023-07-11 NOTE — PROGRESS NOTES
Diagnosis:   Dizziness (R42)  Eustachian tube dysfunction, bilateral (M67.35)        Referring Provider: Deon Raines  Date of Evaluation:    5/30/23    Precautions:  Latex Allergy Next MD visit:   none scheduled  Date of Surgery: n/a   Insurance Primary/Secondary: [address] [place] [address] / N/A     # Auth Visits: 8            Subjective:  Neck is getting better overall. Using towel support and improved pilow which has helped. No true dizziness, still a little off balance with certain movements, top of the stairs. Pain: cervical  R side 6-7/10 L 4-5/10   eval: Current 6/10, Best 2/10, Worst 7/10     Objective: Increased flex CT junction   Deep neck flexor 10 sec  Cervical AROM: no dizziness all directions  ---------------------------  Tandem stance L/R 30 sec, increased imbalance with head turns  Cervical ROM:  Pain all directions, end ranges                         bilat rot 60 deg                         bilat SB 20 deg  VOR head thrust:  Appears (-) bilat. pt guarded with difficulty relaxing, improved with practice  Myotomes strong/equal bilat UE's    Positional Testing:   [place]: R (-), L (-)   [facility]): (-)      [facility]): 36 mod handicap        Assessment: Cervical pain overall decreasing. Pt is incorporating improved postural awareness and breaks from sitting/computer work. Fatigue/neck symptoms can increase with fatigue principles. Goals:   (to be met in 8 visits)  . Independent with HEP including safe return to gym program.  bilat cervical rot at least 70 deg without c/o pain or dizziness. Able to bend to floor for household chores without symptoms of dizziness   (I) stand eyes closed with narrow base of support and head movement for shower safety. Able to tolerate grocery shopping/errands 30 mins without aggravation of dizziness  Pt to report ability to turn over in bed, transfer without dizziness. Plan: reassess next visit. Recheck higher level balance. Review strengthening. Upper back/cervical strengthening. Cont with cervical proprioception. Patient will be seen for 1-2 x/week or a total of 8 visits over a 90 day period. Treatment will include: home exercise program development and instruction, patient/family education, balance training, canalith repositioning maneuver, eye/head coordination exercises, sensory organization training, ROM, manual therapy and strengthening. Date: 6/8/2023  TX#: 2/8 Date:  6/15/23               TX#: 3/8 Date:  6/22/23               TX#: 4/ Date:  6/29/23               TX#: 5/ Date: 7/6/23  Tx#: 6/ 7/11/23 7/8    Reassess  Rhomberg EC 30\"x3 NR 33' NR TE  UBE  3' back  Doorway stretch for pects TE  UBE  3' back  Doorway stretch for pects TE  UBE 4' back  Wall push up  Wall head nod towel 10x    Foam stance --> Rhomberg EC trials  Foam semi tandem L/R 30\" each Foam tandem stance L/R + head turns 10x  Wall slide thoracic ext towel 10x  Reverse T wall slide 10x  Pillow head nod.  10x  Wall push up 10x netural     Cable pull downs 3 plates 75J9  Wall push up 10x2  Neutral spine back on wall  Reverse T wall slide 10x2  Pt ed workout progressions, guidelines, cervical AROM   Supine head nod 10x  Prone UE/LE 10x  Prone SB options LE only --> UE/LE 10x     Foam roll:  -saws 1' each  -pect stretch W and T positions 30\" holds    NR  -foam roll head nod 10x  Supine head nod - pillow  Head nod + rot proprioception 5x each  Supine deep neck flexor place and hold 10\"x3 NR   Posture ed back on wall - neutral  W sets 10x2  Head nod 10x  Deep neck flexor hold 10\" Posture ed back on wall  Cable 3 plates pull downs 87F  Wall slide 10x2  Supine head nod 10x     Latex allergy   Posture review back on wall  Reverse T wall slide 10x Manual 12'  Cervical distraction 60\" holds, SOR  pect minor release Latex allergy Manual   Distraction 60\"x3  SOR 60\" x2  Head nod MWM  Manual   Distraction 60\"x3  SOR 60\" x4  UT release L/R Manual   Distraction 60\"x3  SOR 60\" x3  Mid cervical upslopes Gr II L/R  UT release L/R    HEP review,  guidelines  Manual 12'  Cervical distraction 60\" holds, SOR  pect minor release       HEP: Grounding principles. Standing balance; adding 5 -10 reps slow head turns with grounding. Ease back into head nod/chin tuck. Added supine head nod. Cervical rot painfree range 5x each, retro scap roll     HOME EXERCISE PROGRAM [[id number]]  QR code in Pt Instructions     COMMENT: Practice grounding principles<br>  Ease back into head nod/chin tuck. Sitting or laying down with pillow support. <br>  Continue with your good posture awareness and ergonomics. <br>  Avoid workouts that aggravate neck/dizziness symptoms. Avoid burpees, full push ups for now. You may begin with painfree wall push up.      RHOMBERG STANCE -  Duration 30 Seconds, Complete 2 Sets, Perform 2 Times a Day     RETRACTION / CHIN TUCK -  Repeat 5 Times, Hold 5 Seconds, Complete 1 Set, Perform 1 Times a Day     CERVICAL CHIN TUCK/HEAD NOD  - SUPINE WITH TOWEL -  Repeat 5 Times, Hold 5 Seconds, Complete 1 Set, Perform 1 Times a Day     DOORWAY STRETCH -  Repeat 3 Times, Hold 30 Seconds, Complete 1 Set, Perform 1 Times a Day     WALL PUSH UPS -  Repeat 10 Times, Hold 3 Seconds, Complete 2 Sets, Perform 1 Times a Day      Charges: TE x 2 man x 1    Total Timed Treatment: 45 min  Total Treatment Time: 45 min

## 2023-07-26 NOTE — PROGRESS NOTES
Diagnosis:   Dizziness (R42)  Eustachian tube dysfunction, bilateral (U78.06)        Referring Provider: Ayden Snyder  Date of Evaluation:    5/30/23    Precautions:  Latex Allergy Next MD visit:   none scheduled  Date of Surgery: n/a   Insurance Primary/Secondary: [address] [place] [address] / N/A     # Auth Visits: 8           Progress Summary  Pt has attended 8 visits in Physical Therapy  . Subjective:  Neck is getting better overall. Using towel support and improved pilow which has helped. No true dizziness, still a little off balance with certain movements, top of the stairs. Pain: cervical  R side 6-7/10 L 4-5/10   eval: Current 6/10, Best 2/10, Worst 7/10     Objective: Increased flex CT junction   Deep neck flexor 10 sec  Cervical AROM: no dizziness all directions  ---------------------------  Tandem stance L/R 30 sec, increased imbalance with head turns  Cervical ROM:  Pain all directions, end ranges                         bilat rot 60 deg                         bilat SB 20 deg  VOR head thrust:  Appears (-) bilat. pt guarded with difficulty relaxing, improved with practice  Myotomes strong/equal bilat UE's    Positional Testing:   Wendy-Hallpike: R (-), L (-)   Roll Test [facility]): (-)      [facility]): 36 mod handicap        Assessment: Cervical pain overall decreasing. Pt is incorporating improved postural awareness and breaks from sitting/computer work. Fatigue/neck symptoms can increase with fatigue principles. Goals:   (to be met in 8 visits)  . Independent with HEP including safe return to gym program.  bilat cervical rot at least 70 deg without c/o pain or dizziness. Able to bend to floor for household chores without symptoms of dizziness   (I) stand eyes closed with narrow base of support and head movement for shower safety. Able to tolerate grocery shopping/errands 30 mins without aggravation of dizziness  Pt to report ability to turn over in bed, transfer without dizziness.       Plan: reassess next visit. Recheck higher level balance. Review strengthening. Upper back/cervical strengthening. Cont with cervical proprioception. Patient will be seen for 1-2 x/week or a total of 8 visits over a 90 day period. Treatment will include: home exercise program development and instruction, patient/family education, balance training, canalith repositioning maneuver, eye/head coordination exercises, sensory organization training, ROM, manual therapy and strengthening. Date: 6/8/2023  TX#: 2/8 Date:  6/15/23               TX#: 3/8 Date:  6/22/23               TX#: 4/ Date:  6/29/23               TX#: 5/ Date: 7/6/23  Tx#: 6/ 7/11/23 7/8 7/27/23 8/8   Reassess  Rhomberg EC 30\"x3 NR 33' NR TE  UBE  3' back  Doorway stretch for pects TE  UBE  3' back  Doorway stretch for pects TE  UBE 4' back  Wall push up  Wall head nod towel 10x TE  reassess   Foam stance --> Rhomberg EC trials  Foam semi tandem L/R 30\" each Foam tandem stance L/R + head turns 10x  Wall slide thoracic ext towel 10x  Reverse T wall slide 10x  Pillow head nod.  10x  Wall push up 10x netural     Cable pull downs 3 plates 15N2  Wall push up 10x2  Neutral spine back on wall  Reverse T wall slide 10x2  Pt ed workout progressions, guidelines, cervical AROM   Supine head nod 10x  Prone UE/LE 10x  Prone SB options LE only --> UE/LE 10x     Foam roll:  -saws 1' each  -pect stretch W and T positions 30\" holds    NR  -foam roll head nod 10x  Supine head nod - pillow  Head nod + rot proprioception 5x each  Supine deep neck flexor place and hold 10\"x3 NR   Posture ed back on wall - neutral  W sets 10x2  Head nod 10x  Deep neck flexor hold 10\" Posture ed back on wall  Cable 3 plates pull downs 51E  Wall slide 10x2  Supine head nod 10x     NR  Cable 3 plates  Wall slide  Prone SB  Deep neck flexor  Balance options - progress from Rhomberg  Latex allergy   Posture review back on wall  Reverse T wall slide 10x Manual 12'  Cervical distraction 60\" holds, SOR  pect minor release Latex allergy Manual   Distraction 60\"x3  SOR 60\" x2  Head nod MWM  Manual   Distraction 60\"x3  SOR 60\" x4  UT release L/R Manual   Distraction 60\"x3  SOR 60\" x3  Mid cervical upslopes Gr II L/R  UT release L/R    HEP review,  guidelines  Manual 12'  Cervical distraction 60\" holds, SOR  pect minor release       HEP: Grounding principles. Standing balance; adding 5 -10 reps slow head turns with grounding. Ease back into head nod/chin tuck. Added supine head nod. Cervical rot painfree range 5x each, retro scap roll     HOME EXERCISE PROGRAM [[id number]]  QR code in Pt Instructions     COMMENT: Practice grounding principles<br>  Ease back into head nod/chin tuck. Sitting or laying down with pillow support. <br>  Continue with your good posture awareness and ergonomics. <br>  Avoid workouts that aggravate neck/dizziness symptoms. Avoid burpees, full push ups for now. You may begin with painfree wall push up.      RHOMBERG STANCE -  Duration 30 Seconds, Complete 2 Sets, Perform 2 Times a Day     RETRACTION / CHIN TUCK -  Repeat 5 Times, Hold 5 Seconds, Complete 1 Set, Perform 1 Times a Day     CERVICAL CHIN TUCK/HEAD NOD  - SUPINE WITH TOWEL -  Repeat 5 Times, Hold 5 Seconds, Complete 1 Set, Perform 1 Times a Day     DOORWAY STRETCH -  Repeat 3 Times, Hold 30 Seconds, Complete 1 Set, Perform 1 Times a Day     WALL PUSH UPS -  Repeat 10 Times, Hold 3 Seconds, Complete 2 Sets, Perform 1 Times a Day      Charges: TE x 2 man x 1    Total Timed Treatment: 45 min  Total Treatment Time: 45 min

## 2023-07-27 ENCOUNTER — APPOINTMENT (OUTPATIENT)
Dept: PHYSICAL THERAPY | Age: 43
End: 2023-07-27
Attending: FAMILY MEDICINE
Payer: COMMERCIAL

## 2023-07-30 DIAGNOSIS — M50.90 CERVICAL NECK PAIN WITH EVIDENCE OF DISC DISEASE: ICD-10-CM

## 2023-07-31 RX ORDER — NAPROXEN 500 MG/1
TABLET ORAL
Qty: 60 TABLET | Refills: 0 | OUTPATIENT
Start: 2023-07-31

## 2023-08-02 NOTE — PROGRESS NOTES
Diagnosis:   Dizziness (R42)  Eustachian tube dysfunction, bilateral (J45.21)        Referring Provider: Saritha Vickers  Date of Evaluation:    5/30/23    Precautions:  Latex Allergy Next MD visit:   none scheduled  Date of Surgery: n/a   Insurance Primary/Secondary: [address] [place] [address] / N/A     # Auth Visits: 8           Progress Summary  Pt has attended 8 visits in Physical Therapy. Subjective:  Has had no dizziness but then dizziness came back last week, lasting 2-3 days. Noted L ear was painful 4-5 days and then the dizziness started. Today no dizziness again. Plans to follow up with ENT to discuss. Does have hx of sinus issues. Better overall, still some c/o intermittent neck stiffness but not the severe neck issues. Balance improvement, has not had issues. Pain:   4-5/10 \"better\"   eval: Current 6/10, Best 2/10, Worst 7/10     Objective: Increased flex CT junction   Strength: Deep neck flexor 10 sec                 Myotomes strong/equal bilat UE's  Cervical AROM: no dizziness all directions                             WFL's                            Min restriction with L rot 60 deg L sided stiff  VOR head thrust:  R (-)  L (-)     Positional Testing:   Wendy-Hallpike: R (-), L (-)   Roll Test [facility]): (-)      [facility]): 36 mod handicap        Assessment: Pt presented with cervicogenic dizziness. Good progress in PT with Cervical pain overall decreasing. Dizziness had resolved until last week with flare up for 2-3 days, correlated with L ear pain. Pt has benefited from cervical exercise program and posture awareness principles. She is now ready for d/c with independent HEP. Pt to follow up with PCP and possible ENT. Goals:   (to be met in 8 visits)  . Independent with HEP including safe return to gym program. --> met  bilat cervical rot at least 70 deg without c/o pain or dizziness.    --> in progress  Able to bend to floor for household chores without symptoms of dizziness   --> met  (I) stand eyes closed with narrow base of support and head movement for shower safety. -> met  Able to tolerate grocery shopping/errands 30 mins without aggravation of dizziness -> met  Pt to report ability to turn over in bed, transfer without dizziness. -> met      Plan: Pt has completed 8/8 visits. D/c to HEP. Cervical pain overall much improved but not resolved; pt to follow up with PCP as needed. Patient/Family/Caregiver was advised of these findings, precautions, and treatment options and has agreed to actively participate in planning and for this course of care. Thank you for your referral. If you have any questions, please contact me at Dept: [phone]. Sincerely,  Electronically signed by therapist: Wayne Maloney PT     [de-identified] certification required:  No         Date: 6/8/2023  TX#: 2/8 Date:  6/15/23               TX#: 3/8 Date:  6/22/23               TX#: 4/ Date:  6/29/23               TX#: 5/ Date: 7/6/23  Tx#: 6/ 7/11/23  7/8 8/3/23  8/8   Reassess  Rhomberg EC 30\"x3 NR 33' NR TE  UBE  3' back  Doorway stretch for pects TE  UBE  3' back  Doorway stretch for pects TE  UBE 4' back  Wall push up  Wall head nod towel 10x TE  Reassess and review of HEP for d/c  Thoracic wall slide 10    Head nod supine  Head nod + rot L/R  Deep neck flexor hold  Reverse T wall slide 10x  Balance upgrades -     SOR with tennis balls   Foam stance --> Rhomberg EC trials  Foam semi tandem L/R 30\" each Foam tandem stance L/R + head turns 10x  Wall slide thoracic ext towel 10x  Reverse T wall slide 10x  Pillow head nod.  10x  Wall push up 10x netural     Cable pull downs 3 plates 90N9  Wall push up 10x2  Neutral spine back on wall  Reverse T wall slide 10x2  Pt ed workout progressions, guidelines, cervical AROM   Supine head nod 10x  Prone UE/LE 10x  Prone SB options LE only --> UE/LE 10x     Foam roll:  -saws 1' each  -pect stretch W and T positions 30\" holds    NR  -foam roll head nod 10x  Supine head nod - pillow  Head nod + rot proprioception 5x each  Supine deep neck flexor place and hold 10\"x3 NR   Posture ed back on wall - neutral  W sets 10x2  Head nod 10x  Deep neck flexor hold 10\" Posture ed back on wall  Cable 3 plates pull downs 48L  Wall slide 10x2  Supine head nod 10x     Manual   Distraction 60\"x3  SOR 60\" x3   Posture review back on wall  Reverse T wall slide 10x Manual 12'  Cervical distraction 60\" holds, SOR  pect minor release Latex allergy Manual   Distraction 60\"x3  SOR 60\" x2  Head nod MWM  Manual   Distraction 60\"x3  SOR 60\" x4  UT release L/R Manual   Distraction 60\"x3  SOR 60\" x3  Mid cervical upslopes Gr II L/R  UT release L/R    HEP review,  guidelines  Manual 12'  Cervical distraction 60\" holds, SOR  pect minor release       HEP: Grounding principles. Standing balance; adding 5 -10 reps slow head turns with grounding. Ease back into head nod/chin tuck. Added supine head nod. Cervical rot painfree range 5x each, retro scap roll     HOME EXERCISE PROGRAM [[id number]]  QR code in Pt Instructions     COMMENT: Practice grounding principles<br>  Ease back into head nod/chin tuck. Sitting or laying down with pillow support. <br>  Continue with your good posture awareness and ergonomics. <br>  Avoid workouts that aggravate neck/dizziness symptoms. Avoid burpees, full push ups for now. You may begin with painfree wall push up.      RHOMBERG STANCE -  Duration 30 Seconds, Complete 2 Sets, Perform 2 Times a Day     RETRACTION / CHIN TUCK -  Repeat 5 Times, Hold 5 Seconds, Complete 1 Set, Perform 1 Times a Day     CERVICAL CHIN TUCK/HEAD NOD  - SUPINE WITH TOWEL -  Repeat 5 Times, Hold 5 Seconds, Complete 1 Set, Perform 1 Times a Day     DOORWAY STRETCH -  Repeat 3 Times, Hold 30 Seconds, Complete 1 Set, Perform 1 Times a Day     WALL PUSH UPS -  Repeat 10 Times, Hold 3 Seconds, Complete 2 Sets, Perform 1 Times a Day      Charges: TE x 2 man x 1    Total Timed Treatment: 45 min  Total Treatment Time: 45 min

## 2023-08-03 ENCOUNTER — OFFICE VISIT (OUTPATIENT)
Dept: PHYSICAL THERAPY | Age: 43
End: 2023-08-03
Attending: FAMILY MEDICINE
Payer: COMMERCIAL

## 2023-08-03 PROCEDURE — 97140 MANUAL THERAPY 1/> REGIONS: CPT

## 2023-08-03 PROCEDURE — 97110 THERAPEUTIC EXERCISES: CPT

## 2023-08-10 ENCOUNTER — APPOINTMENT (OUTPATIENT)
Dept: PHYSICAL THERAPY | Age: 43
End: 2023-08-10
Attending: FAMILY MEDICINE
Payer: COMMERCIAL

## 2023-09-07 ENCOUNTER — TELEPHONE (OUTPATIENT)
Dept: FAMILY MEDICINE CLINIC | Facility: CLINIC | Age: 43
End: 2023-09-07

## 2023-09-07 NOTE — TELEPHONE ENCOUNTER
Patient would like her blood work orders put in before her appointment so she can go over them with doctor.

## 2023-09-08 NOTE — TELEPHONE ENCOUNTER
Pt informed that Dr. Carol Elise will order lab work at her upcoming appt on 9/29/2023. Pt will receive results on My Chart.

## 2023-09-25 RX ORDER — NITROFURANTOIN 25; 75 MG/1; MG/1
100 CAPSULE ORAL AS NEEDED
COMMUNITY
Start: 2023-07-30 | End: 2023-09-29

## 2023-09-29 ENCOUNTER — OFFICE VISIT (OUTPATIENT)
Dept: FAMILY MEDICINE CLINIC | Facility: CLINIC | Age: 43
End: 2023-09-29
Payer: COMMERCIAL

## 2023-09-29 VITALS
TEMPERATURE: 97 F | SYSTOLIC BLOOD PRESSURE: 100 MMHG | HEART RATE: 64 BPM | DIASTOLIC BLOOD PRESSURE: 60 MMHG | WEIGHT: 174 LBS | HEIGHT: 64 IN | OXYGEN SATURATION: 98 % | BODY MASS INDEX: 29.71 KG/M2 | RESPIRATION RATE: 20 BRPM

## 2023-09-29 DIAGNOSIS — Z00.00 ANNUAL PHYSICAL EXAM: Primary | ICD-10-CM

## 2023-09-29 DIAGNOSIS — E78.5 DYSLIPIDEMIA (HIGH LDL; LOW HDL): ICD-10-CM

## 2023-09-29 DIAGNOSIS — Z13.21 SCREENING FOR ENDOCRINE, NUTRITIONAL, METABOLIC AND IMMUNITY DISORDER: ICD-10-CM

## 2023-09-29 DIAGNOSIS — Z13.228 SCREENING FOR ENDOCRINE, NUTRITIONAL, METABOLIC AND IMMUNITY DISORDER: ICD-10-CM

## 2023-09-29 DIAGNOSIS — M47.812 SPONDYLOSIS OF CERVICAL REGION WITHOUT MYELOPATHY OR RADICULOPATHY: ICD-10-CM

## 2023-09-29 DIAGNOSIS — Z12.31 SCREENING MAMMOGRAM, ENCOUNTER FOR: ICD-10-CM

## 2023-09-29 DIAGNOSIS — Z13.0 SCREENING FOR ENDOCRINE, NUTRITIONAL, METABOLIC AND IMMUNITY DISORDER: ICD-10-CM

## 2023-09-29 DIAGNOSIS — Z12.11 SCREEN FOR COLON CANCER: ICD-10-CM

## 2023-09-29 DIAGNOSIS — Z23 NEED FOR VACCINATION: ICD-10-CM

## 2023-09-29 DIAGNOSIS — Z13.29 SCREENING FOR ENDOCRINE, NUTRITIONAL, METABOLIC AND IMMUNITY DISORDER: ICD-10-CM

## 2023-09-29 PROBLEM — R00.2 PALPITATIONS: Status: RESOLVED | Noted: 2018-10-12 | Resolved: 2023-09-29

## 2023-09-29 PROCEDURE — 99396 PREV VISIT EST AGE 40-64: CPT | Performed by: FAMILY MEDICINE

## 2023-09-29 PROCEDURE — 3078F DIAST BP <80 MM HG: CPT | Performed by: FAMILY MEDICINE

## 2023-09-29 PROCEDURE — 90686 IIV4 VACC NO PRSV 0.5 ML IM: CPT | Performed by: FAMILY MEDICINE

## 2023-09-29 PROCEDURE — 3074F SYST BP LT 130 MM HG: CPT | Performed by: FAMILY MEDICINE

## 2023-09-29 PROCEDURE — 90471 IMMUNIZATION ADMIN: CPT | Performed by: FAMILY MEDICINE

## 2023-09-29 PROCEDURE — 90472 IMMUNIZATION ADMIN EACH ADD: CPT | Performed by: FAMILY MEDICINE

## 2023-09-29 PROCEDURE — 90636 HEP A/HEP B VACC ADULT IM: CPT | Performed by: FAMILY MEDICINE

## 2023-09-29 PROCEDURE — 3008F BODY MASS INDEX DOCD: CPT | Performed by: FAMILY MEDICINE

## 2023-10-13 ENCOUNTER — NURSE ONLY (OUTPATIENT)
Dept: FAMILY MEDICINE CLINIC | Facility: CLINIC | Age: 43
End: 2023-10-13
Payer: COMMERCIAL

## 2023-10-13 DIAGNOSIS — Z13.21 SCREENING FOR ENDOCRINE, NUTRITIONAL, METABOLIC AND IMMUNITY DISORDER: ICD-10-CM

## 2023-10-13 DIAGNOSIS — Z13.0 SCREENING FOR ENDOCRINE, NUTRITIONAL, METABOLIC AND IMMUNITY DISORDER: ICD-10-CM

## 2023-10-13 DIAGNOSIS — Z13.29 SCREENING FOR ENDOCRINE, NUTRITIONAL, METABOLIC AND IMMUNITY DISORDER: ICD-10-CM

## 2023-10-13 DIAGNOSIS — E78.5 DYSLIPIDEMIA (HIGH LDL; LOW HDL): ICD-10-CM

## 2023-10-13 DIAGNOSIS — Z13.228 SCREENING FOR ENDOCRINE, NUTRITIONAL, METABOLIC AND IMMUNITY DISORDER: ICD-10-CM

## 2023-10-13 DIAGNOSIS — Z00.00 ANNUAL PHYSICAL EXAM: ICD-10-CM

## 2023-10-13 LAB
ALBUMIN SERPL-MCNC: 3.6 G/DL (ref 3.4–5)
ALBUMIN/GLOB SERPL: 0.9 {RATIO} (ref 1–2)
ALP LIVER SERPL-CCNC: 54 U/L
ALT SERPL-CCNC: 19 U/L
ANION GAP SERPL CALC-SCNC: 2 MMOL/L (ref 0–18)
AST SERPL-CCNC: 15 U/L (ref 15–37)
BASOPHILS # BLD AUTO: 0.03 X10(3) UL (ref 0–0.2)
BASOPHILS NFR BLD AUTO: 0.5 %
BILIRUB SERPL-MCNC: 0.7 MG/DL (ref 0.1–2)
BUN BLD-MCNC: 20 MG/DL (ref 7–18)
CALCIUM BLD-MCNC: 9 MG/DL (ref 8.5–10.1)
CHLORIDE SERPL-SCNC: 106 MMOL/L (ref 98–112)
CHOLEST SERPL-MCNC: 199 MG/DL (ref ?–200)
CO2 SERPL-SCNC: 28 MMOL/L (ref 21–32)
CREAT BLD-MCNC: 0.71 MG/DL
EGFRCR SERPLBLD CKD-EPI 2021: 107 ML/MIN/1.73M2 (ref 60–?)
EOSINOPHIL # BLD AUTO: 0.11 X10(3) UL (ref 0–0.7)
EOSINOPHIL NFR BLD AUTO: 1.7 %
ERYTHROCYTE [DISTWIDTH] IN BLOOD BY AUTOMATED COUNT: 11.7 %
FASTING PATIENT LIPID ANSWER: YES
FASTING STATUS PATIENT QL REPORTED: YES
GLOBULIN PLAS-MCNC: 3.9 G/DL (ref 2.8–4.4)
GLUCOSE BLD-MCNC: 90 MG/DL (ref 70–99)
HCT VFR BLD AUTO: 39.1 %
HDLC SERPL-MCNC: 47 MG/DL (ref 40–59)
HGB BLD-MCNC: 12.9 G/DL
IMM GRANULOCYTES # BLD AUTO: 0.02 X10(3) UL (ref 0–1)
IMM GRANULOCYTES NFR BLD: 0.3 %
LDLC SERPL CALC-MCNC: 142 MG/DL (ref ?–100)
LYMPHOCYTES # BLD AUTO: 2.51 X10(3) UL (ref 1–4)
LYMPHOCYTES NFR BLD AUTO: 38.7 %
MCH RBC QN AUTO: 29.6 PG (ref 26–34)
MCHC RBC AUTO-ENTMCNC: 33 G/DL (ref 31–37)
MCV RBC AUTO: 89.7 FL
MONOCYTES # BLD AUTO: 0.46 X10(3) UL (ref 0.1–1)
MONOCYTES NFR BLD AUTO: 7.1 %
NEUTROPHILS # BLD AUTO: 3.35 X10 (3) UL (ref 1.5–7.7)
NEUTROPHILS # BLD AUTO: 3.35 X10(3) UL (ref 1.5–7.7)
NEUTROPHILS NFR BLD AUTO: 51.7 %
NONHDLC SERPL-MCNC: 152 MG/DL (ref ?–130)
OSMOLALITY SERPL CALC.SUM OF ELEC: 284 MOSM/KG (ref 275–295)
PLATELET # BLD AUTO: 233 10(3)UL (ref 150–450)
POTASSIUM SERPL-SCNC: 4.2 MMOL/L (ref 3.5–5.1)
PROT SERPL-MCNC: 7.5 G/DL (ref 6.4–8.2)
RBC # BLD AUTO: 4.36 X10(6)UL
SODIUM SERPL-SCNC: 136 MMOL/L (ref 136–145)
TRIGL SERPL-MCNC: 52 MG/DL (ref 30–149)
TSI SER-ACNC: 1.77 MIU/ML (ref 0.36–3.74)
VLDLC SERPL CALC-MCNC: 10 MG/DL (ref 0–30)
WBC # BLD AUTO: 6.5 X10(3) UL (ref 4–11)

## 2023-10-13 PROCEDURE — 80061 LIPID PANEL: CPT | Performed by: FAMILY MEDICINE

## 2023-10-13 PROCEDURE — 36415 COLL VENOUS BLD VENIPUNCTURE: CPT | Performed by: FAMILY MEDICINE

## 2023-10-13 PROCEDURE — 80050 GENERAL HEALTH PANEL: CPT | Performed by: FAMILY MEDICINE

## 2023-10-21 ENCOUNTER — HOSPITAL ENCOUNTER (OUTPATIENT)
Dept: MAMMOGRAPHY | Facility: HOSPITAL | Age: 43
Discharge: HOME | End: 2023-10-21
Attending: FAMILY MEDICINE

## 2023-10-21 DIAGNOSIS — Z12.31 SCREENING MAMMOGRAM, ENCOUNTER FOR: ICD-10-CM

## 2023-10-21 DIAGNOSIS — Z00.00 ANNUAL PHYSICAL EXAM: ICD-10-CM

## 2023-10-21 PROCEDURE — 77063 BREAST TOMOSYNTHESIS BI: CPT | Performed by: FAMILY MEDICINE

## 2023-10-21 PROCEDURE — 77067 SCR MAMMO BI INCL CAD: CPT | Performed by: FAMILY MEDICINE

## 2023-10-25 DIAGNOSIS — R92.333 HETEROGENEOUSLY DENSE TISSUE OF BOTH BREASTS ON MAMMOGRAPHY: Primary | ICD-10-CM

## 2023-10-26 NOTE — PROGRESS NOTES
Results reviewed. Released to [facility]. See my chart message to patient. Recommended complete breast ultrasound. Order placed.

## 2023-11-07 NOTE — PROGRESS NOTES
Results reviewed. Please inform patient lipids abnormal and slightly increased from the year prior. Recommend Mediterranean diet, weight loss is overweight, regular aerobic exercise 30 to 60 minutes 5 days a week, avoid tobacco or nicotine.   Recommend rep Azathioprine Counseling:  I discussed with the patient the risks of azathioprine including but not limited to myelosuppression, immunosuppression, hepatotoxicity, lymphoma, and infections.  The patient understands that monitoring is required including baseline LFTs, Creatinine, possible TPMP genotyping and weekly CBCs for the first month and then every 2 weeks thereafter.  The patient verbalized understanding of the proper use and possible adverse effects of azathioprine.  All of the patient's questions and concerns were addressed.

## 2023-11-17 ENCOUNTER — TELEPHONE (OUTPATIENT)
Dept: FAMILY MEDICINE CLINIC | Facility: CLINIC | Age: 43
End: 2023-11-17

## 2023-11-17 NOTE — TELEPHONE ENCOUNTER
[employer] cologuard form has been completed review and singed. Faxed along with patient insurance information, ID, demographics.      Fax # [phone]

## 2024-01-16 ENCOUNTER — HOSPITAL ENCOUNTER (OUTPATIENT)
Dept: MAMMOGRAPHY | Facility: HOSPITAL | Age: 44
Discharge: HOME | End: 2024-01-16
Attending: FAMILY MEDICINE
Payer: COMMERCIAL

## 2024-01-16 DIAGNOSIS — R92.333 HETEROGENEOUSLY DENSE TISSUE OF BOTH BREASTS ON MAMMOGRAPHY: ICD-10-CM

## 2024-01-16 PROCEDURE — 76641 ULTRASOUND BREAST COMPLETE: CPT | Performed by: FAMILY MEDICINE

## 2024-02-08 ENCOUNTER — OFFICE VISIT (OUTPATIENT)
Dept: FAMILY MEDICINE CLINIC | Facility: CLINIC | Age: 44
End: 2024-02-08
Payer: COMMERCIAL

## 2024-02-08 VITALS
OXYGEN SATURATION: 99 % | BODY MASS INDEX: 30 KG/M2 | TEMPERATURE: 97 F | SYSTOLIC BLOOD PRESSURE: 100 MMHG | DIASTOLIC BLOOD PRESSURE: 62 MMHG | HEART RATE: 92 BPM | RESPIRATION RATE: 18 BRPM | WEIGHT: 176.81 LBS

## 2024-02-08 DIAGNOSIS — M50.90 CERVICAL NECK PAIN WITH EVIDENCE OF DISC DISEASE: Primary | ICD-10-CM

## 2024-02-08 DIAGNOSIS — M47.812 SPONDYLOSIS OF CERVICAL REGION WITHOUT MYELOPATHY OR RADICULOPATHY: ICD-10-CM

## 2024-02-08 PROCEDURE — 3074F SYST BP LT 130 MM HG: CPT | Performed by: FAMILY MEDICINE

## 2024-02-08 PROCEDURE — 3078F DIAST BP <80 MM HG: CPT | Performed by: FAMILY MEDICINE

## 2024-02-08 PROCEDURE — 99214 OFFICE O/P EST MOD 30 MIN: CPT | Performed by: FAMILY MEDICINE

## 2024-02-08 RX ORDER — NAPROXEN 500 MG/1
500 TABLET ORAL 2 TIMES DAILY WITH MEALS
Qty: 60 TABLET | Refills: 0 | Status: SHIPPED | OUTPATIENT
Start: 2024-02-08

## 2024-02-08 RX ORDER — OMEPRAZOLE 20 MG/1
CAPSULE, DELAYED RELEASE ORAL
Qty: 30 CAPSULE | Refills: 6 | Status: SHIPPED | OUTPATIENT
Start: 2024-02-08

## 2024-02-08 RX ORDER — CARISOPRODOL 250 MG/1
250 TABLET ORAL NIGHTLY PRN
Qty: 20 TABLET | Refills: 0 | Status: SHIPPED | OUTPATIENT
Start: 2024-02-08

## 2024-02-08 NOTE — PROGRESS NOTES
CHIEF COMPLAINT:   Chief Complaint   Patient presents with    Neck Pain         HPI:     Marni Edwards is a 43 year old female presents for presents for recurrent chronic neck pain on left side  restarted 2 weeks ago only on the left side of her neck and radiated to her shoulder.  Pain is worse with forward bending and extension but also has pain with rotation to the left and right but very tolerable.  Patient ranks the pain an 7 out of 10.  Improves with NSAIDs.  Similar to last flare.  Worked 12-hour days after vacation the past 10 to 12 days and believes long hours at the [employer] may have caused the flare.  Tries to watch ergonomics.  Previously went to physical therapy and helped tremendously.   She has history of degenerative changes of the cervical spine in 2018 with MRI which demonstrated C5-C6 mild spinal canal stenosis with mild left neuroforaminal stenosis related to a left paracentral disc protrusion and C6 and C7 showed mild spinal canal stenosis related to central disc protrusion.  Last cervical x-ray 2/14/2022 showed chronic appearing changes with degenerative changes and minimal neural foraminal encroachment without significant stenosis.  Straightening the spine likely chronic no fracture or subluxation.  During that time she had left-sided radiculopathy which has resolved.  Denies any bowel bladder incontinence or perianal anesthesia.  Denies denies any shortness of breath.  Denies any numbness or tingling or weakness in the arms or legs.  Denies any bowel or bladder incontinence or perianal anesthesia.  Not dropping objects.  Right-handed.      HISTORY:  Past Medical History:   Diagnosis Date    Osteoarthritis     rheumatory    Palpitations 10/12/2018    Patient denies medical problems     Tubal pregnancy       Past Surgical History:   Procedure Laterality Date    OTHER SURGICAL HISTORY  2012    cervical polypectomy : benign    TUBAL LIGATION  2011    left tubal ligation (right  salpingectomy)      Family History   Problem Relation Age of Onset    Breast Cancer Mother 61    Cancer Mother         Breast cancer diagnosis in 2018    Hypertension Mother     Hypertension Father     Hypertension Maternal Grandmother     Hypertension Maternal Grandfather     No Known Problems Paternal Grandmother     No Known Problems Paternal Grandfather       Social History:   Social History     Socioeconomic History    Marital status:    Occupational History    Occupation:    Tobacco Use    Smoking status: Never    Smokeless tobacco: Never   Vaping Use    Vaping Use: Never used   Substance and Sexual Activity    Alcohol use: Yes     Alcohol/week: 2.0 standard drinks of alcohol     Types: 2 Standard drinks or equivalent per week     Comment: social    Drug use: No    Sexual activity: Yes     Partners: Male   Other Topics Concern    Caffeine Concern No    Exercise Yes    Seat Belt Yes        Medications (Active prior to today's visit):  Current Outpatient Medications   Medication Sig Dispense Refill    Terconazole 0.8 % Vaginal Cream Place vaginally every 4 (four) hours. AT BEDTIME      Levonorgestrel (MIRENA, 52 MG,) 20 MCG/DAY Intrauterine IUD 20 mcg (1 each total) by Intrauterine route one time.         Allergies:  Allergies   Allergen Reactions    Avocado PAIN     cramping    Gluten Flour RASH    Other PAIN     Agave syrup     Pineapple Juice PAIN    Latex OTHER (SEE COMMENTS)     Skin burning sensation        PSFH elements reviewed from today and agreed except as otherwise stated in HPI.  PHYSICAL EXAM:   /62 (BP Location: Left arm, Patient Position: Sitting, Cuff Size: adult)   Pulse 92   Temp 97.2 °F (36.2 °C) (Temporal)   Resp 18   Wt 176 lb 12.8 oz (80.2 kg)   LMP  (LMP Unknown)   SpO2 99%   BMI 30.35 kg/m²   Vital signs reviewed.Appears stated age, well groomed.  Physical Exam  Neck:        Comments: Red dot marks the area of tenderness on the left side of the neck and  lower midline       Physical Exam   General: Well-nourished, well hydrated. No acute distress. No pallor. No tachypnea  HEENT: normocephaly, atraumatic. PERRL bilaterally. Sclera clear and non icteric bilaterally.  Wearing mask  NECK: supple. No adenopathy.  Cervical spinal tenderness C5, C6-C7 midline without left-sided neck tenderness over strap muscles that is resolved.  Cervical range of motion: Normal rotation right and left to 80 degrees. Forward bend 20 degrees-very painful unable to perform chin to chest and extend 15 degrees.  Negative Lhermitte sign pt denies electrical sensation or shooting pain with head extension. cspine tightness and muscle spasm bilateral  HESRT: RRR without S3 or S4 murmur . Clear S1S2  Lungs: clear to auscultation bilaterally. No rales, rhonchi or wheezes. Good inspiratory and expiratory effort  Extremities: No cyanosis, clubbing, or edema bilaterally . MS +5/5 all u/LE symmetric and bilateral. Radial pulse +2 and strong.  Skin: no rashes, pink, no redness of the cervical spine no increased temperature to palpation.  Neuro: AOx3. Sensation intact UE/LE DTR +2/4 bilateral and equal and symmetric.  Upper/lower extremity muscle strength plus 5 out of 5 bilateral equal and symmetric.  Normal gait.  Normal heel to do no foot drop.  LABS        arrative   PROCEDURE:  XR CERVICAL SPINE (4VIEWS) (CPT=72050)     TECHNIQUE:  AP, lateral, obliques, and coned down view of the spine were obtained.     COMPARISON:  GIANA , NITZA, SPINE CERVICAL 2/3 S, 3/27/2013, 12:58 PM.     INDICATIONS:  M50.90 Cervical neck pain with evidence of disc disease     PATIENT STATED HISTORY: (As transcribed by Technologist)  Patient presents with lower neck pain for the past two weeks with no known injury.         FINDINGS:       There is straightening of the cervical spine, with loss of usually observed lordosis. However there is no kyphosis. This appearance can be due to any of the following, including in  combination: Neck pain, muscle spasm, cervical strain, cervical  degenerative disease, and injury.  No fracture or subluxation.  Disc degeneration with mild disc narrowing C4-5 and C5-6 and C6-7.  Oblique view show minimal neural foraminal osteophyte encroachment C5-6 and C6-7 on the left and C5-6 and C6-7 on the right, without significant foraminal  stenosis.  No prevertebral swelling.  No destructive process.                   Impression   CONCLUSION:  Chronic appearing changes with degenerative changes and minimal neural foraminal encroachment without significant stenosis.  Straightening the spine likely chronic.  No fracture or subluxation.        Dictated by (CST): Parth Lewis MD on 2/14/2022 at 7:34 PM      Finalized by (CST): Parth Lewis MD on 2/14/2022 at 7:37 PM       Result History    XR CERVICAL SPINE (4VIEWS) (CPT=72050) (Order #[id number]) on 2/14/2022 - Order Result History Report  Signed by    Signed Time Phone Pager   Parth Lewis MD 2/14/2022 19:37 [phone]      arrative   PROCEDURE: MRI SPINE CERVICAL (CPT=72141)     COMPARISON: None.     INDICATIONS: LT sided neck pain radiating down into shoulder and arm x's 2 weeks. Numbness in fingers. No known injury.     TECHNIQUE: A variety of imaging planes and parameters were utilized for visualization of suspected pathology.       FINDINGS:  ALIGNMENT: The anatomic cervical lordosis is preserved. Slight levoscoliosis.  BONES: No fractures or osseous lesions are apparent.  CORD: Normal caliber, contour, and signal intensity.    CRANIOCERVICAL AREA: Normal foramen magnum without Chiari malformation.    PARASPINAL AREA: No visible mass.    OTHER: There is no visible swelling of the prevertebral soft tissues. There is mild to moderate adenoid enlargement.     CERVICAL DISC LEVELS:  C2-C3: No significant disc/facet abnormality, spinal stenosis, or foraminal stenosis.    C3-C4: No significant disc/facet abnormality, spinal stenosis, or foraminal  stenosis.    C4-C5: No significant disc/facet abnormality, spinal stenosis, or foraminal stenosis.    C5-C6: There is a small posterior disc osteophyte complex with superimposed left paracentral disc protrusion. Effacement of the ventral thecal sac with mild spinal canal stenosis. There is also mild left neural foraminal stenosis without significant  right neural foraminal stenosis.  C6-C7: There is a moderate posterior disc-osteophyte complex with superimposed central disc protrusion. Effacement of the ventral thecal sac with mild spinal canal stenosis. No significant neural foraminal compromise.  C7-T1: No significant disc/facet abnormality, spinal stenosis, or foraminal stenosis.        Impression   CONCLUSION:  1. No acute cervical spine fracture or malalignment. Normal caliber and signal characteristics of the cervical spinal cord.  2. C5-C6: Mild spinal canal and mild left neural foraminal stenosis related to a left paracentral disc protrusion.  3. C6-C7: Mild spinal canal stenosis related to a central disc protrusion.  4. Slight cervical levoscoliosis, which may be positional.  5. Mild to moderate adenoid enlargement, likely a reactive finding given patient age.              Dictated by (CST): Denis Cunningham MD on 10/17/2018 at 15:19      Approved by (CST): Denis Cunningham MD on 10/17/2018 at 15:23       REVIEWED THIS VISIT  ASSESSMENT/PLAN:   43 year old female with    1. Cervical neck pain with evidence of disc disease  2. Spondylosis of cervical region without myelopathy or radiculopathy  - Carisoprodol 250 MG Oral Tab; Take 1 tablet (250 mg total) by mouth nightly as needed (no driving or operating machinery for 8h after ingestion. avoid Etoh).  Dispense: 20 tablet; Refill: 0  - naproxen 500 MG Oral Tab; Take 1 tablet (500 mg total) by mouth 2 (two) times daily with meals. Take with food and water stop and call of GI side effects  Dispense: 60 tablet; Refill: 0  - OP REFERRAL TO [facility]  - omeprazole 20 MG Oral Capsule Delayed Release; 1 tab po q am on empty stomach 45mins AC breakfast  Dispense: 30 capsule; Refill: 1  Flare of chronic condition-uncontrolled  Neurovascular intact  neck pain flare from DJD.  Has history of degenerative disc disease of the cervical spine.  Risk, benefits and side effects of medication discussed-including risk of GI bleeding gastritis and heartburn with naproxen-take with food and water.  Will use omeprazole as GI prophylaxis. carisoprodol-causes sedation, dizziness, constipation, risk of dependence etc.  Take at least 2 hours prior to bedtime no driving after ingestion.  Avoid other sedating medications  Symptoms are very similar to prior flares imaging is not indicated since neuroexam is intact and there is no radiculopathy symptoms.  Start physical therapy  Risk, benefits and side effects of medication, Carisoprodol, discussed including sedation, dizziness, dry mouth, constipation, addiction etc. No driving or operating machinery for 8 hours after ingestion.  Recommend taking 2 hours prior to go to bed.  If too sedating may decrease dose by half a tablet/5 mg.  Call if any moderate or severe side effects.    Recheck in 6 weeks if not resolved after physical therapy sooner if worse or new symptoms occur or develops weakness in the arm, numbness or tingling in the arms or legs or difficulty walking, perianal anesthesia excetra.      Meds This Visit:  Requested Prescriptions     Signed Prescriptions Disp Refills    Carisoprodol 250 MG Oral Tab 20 tablet 0     Sig: Take 1 tablet (250 mg total) by mouth nightly as needed (no driving or operating machinery for 8h after ingestion. avoid Etoh).    naproxen 500 MG Oral Tab 60 tablet 0     Sig: Take 1 tablet (500 mg total) by mouth 2 (two) times daily with meals. Take with food and water stop and call of GI side effects    omeprazole 20 MG Oral Capsule Delayed Release 30 capsule 6     Si tab po q am on empty  stomach 45mins AC breakfast. Take only while using naproxen in AM.  GI prophylaxis       Health Maintenance:  Health Maintenance   Topic Date Due    COVID-19 Vaccine (5 - 2023-24 season) 09/01/2023    Annual Physical  09/29/2024    Mammogram  10/21/2024    Colorectal Cancer Screening  11/30/2026    Pap Smear  09/09/2027    DTaP,Tdap,and Td Vaccines (2 - Td or Tdap) 01/11/2028    Influenza Vaccine  Completed    Annual Depression Screening  Completed    Pneumococcal Vaccine: Birth to 64yrs  Aged Out         Patient/Caregiver Education: Patient/Caregiver Education: There are no barriers to learning. Medical education done.   Outcome: Patient verbalizes understanding. Patient is notified to call with any questions, comp lications, allergies, or worsening or changing symptoms.  Patient is to call with any side effects or complications from the treatments as a result of today.     Problem List:     Patient Active Problem List   Diagnosis    Primary osteoarthritis of both hands    Dyslipidemia (high LDL; low HDL)    Vitamin D deficiency    Left cervical radiculopathy    Cervical neck pain with evidence of disc disease    Tension headache    Benign paroxysmal positional vertigo of right ear    History of endometrial biopsy 2016 neg, 2013 neg    Dyspareunia in female    Yeast infection    Recurrent vaginitis    Dense breast tissue on mammogram    Spondylosis of cervical region without myelopathy or radiculopathy    BMI 29.0-29.9,adult       Imaging & Referrals:  None     2/8/2024  aSrah Munoz, DO      Patient understands plan and follow-up.  Return in about 6 weeks (around 3/21/2024), or if symptoms worsen or fail to improve after physical therapy.        I was present for and supervised the key and critical aspects of the procedures performed during the care of the patient. I was present for and supervised the key and critical aspects of the procedures performed during the care of the patient.

## 2024-03-14 ENCOUNTER — TELEPHONE (OUTPATIENT)
Dept: PHYSICAL THERAPY | Facility: HOSPITAL | Age: 44
End: 2024-03-14

## 2024-03-18 NOTE — PROGRESS NOTES
SPINE EVALUATION:     Diagnosis:   Spondylosis of cervical region without myelopathy or radiculopathy (M47.812)  Cervical neck pain with evidence of disc disease (M50.90)      Referring Provider: Tammy  Date of Evaluation:    3/19/2024    Precautions:  Latex Allergy Next MD visit:   none scheduled  Date of Surgery: n/a     PATIENT SUMMARY   Marni Edwards is a 43 year old female who presents to therapy today with complaints of neck pain L>R that began approx 4 weeks ago.  Hx of same symptoms in the past, that were aggravated after 2-3 days of long hours sitting and work; 12 hours without a break.  Also notes the dizziness came back ~ 2 weeks.  Currently dizziness has resolved and \"I just have the neck pain\". She has also been better at taking breaks at work.    Reports sleeping typically fine but may end up in uncomfortable position, will wake with pain. Stretches have helped. Also reports assoc HA a few times a week.  Stretches help. Ibuprofen has helped.   Feels better with support neck support  Naproxen and muscle relaxer     Pt describes pain level current 3/10, at best 3/10, at worst 9/10.     Current functional limitations include pain/difficulty with neck movements, turning head, pain limiting prolonged sitting/computer work. Hx of assoc dizziness with neck pain.     Marni describes prior level of function. Independent.  working out with  keeping weights to 5 lbs.  10 lbs aggravates neck pain  . Pt goals include improve neck pain.  Past medical history was reviewed with Marni. Significant findings include  has a past medical history of Osteoarthritis, cervical spondylosis, DDD, BPPV.   Pt denies diplopia, dysarthria, dysphasia, dizziness, drop attacks, bowel/bladder changes, saddle anesthesia, and SHIMA LE N/T.    ASSESSMENT  Marni presents to physical therapy evaluation with primary c/o aggravation of neck pain and associated dizziness ~ 4 weeks ago. The results of the objective tests  and measures show decreased/painful cervical ROM, (+) muscular and facet tenderness L>R, decreased flexibility and associated postural faults. Distal myotomes strong/equal.  Pt does note improvement with the anti inflammatories, stretches and ergonomic considerations.  Functional deficits include but are not limited to pain/difficulty with sitting/computer work, turning the head, and morning pain/stiffness.  Signs and symptoms are consistent with diagnosis of cervical DDD, facet dysfunction and cervicogenic dizziness. Pt and PT discussed evaluation findings, pathology, POC and HEP.  Pt voiced understanding and performs HEP correctly without reported pain. Skilled Physical Therapy is medically necessary to address the above impairments and reach functional goals.     OBJECTIVE:   Observation/Posture: forward head/shoulders  Gait: coordinated, WNL's  Neuro Screen:    Sensation: Bilat UE's grossly intact to light touch all dermatomes   Denies changes in bowel/bladder function     Cervical ROM:   (degrees)   Flexion:         30    Extension  30  *pain      Right               Left   rotation  60*pain       45*pain    SB  30                20*pain     Shoulder Screen: ROM WFL's   Accessory Motion: (+) pain throughout L cervical facets.    Palpation: (+) L>R tenderness multiple muscle groups, UT, levator. Significant tenderness throughout facets L>R    Strength/Muscle Power:    pain; to be further assessed                        Right                  Left  Wrist ext (C6)   5 /5    5/5   Wrist flex (C7)    5/5    5/5   Finger flexors, Thumb Extension (C8)    5/5    5/5     Flexibility:  restriction upper traps, pects, levator      Special Tests:   Modified Vetebral Artery: R no symptoms provoked; L no symptoms provoked   Spurling's: R Negative; L Negative   Cervical Compression/Distraction:relief with distraction.         Today’s Treatment and Response: Manual: manual cervical distraction 60\" holds x5, STM UT, L upslopes  Gr I 10\" x3.   Pt education was provided on exam findings, treatment diagnosis, treatment plan, expectations, and prognosis. Pt was also provided recommendations for activity modifications, modalities as needed [ice/heat], postural corrections, ergonomics, pain science education , and importance of remaining active. Breaks from sitting, stretch breaks, pillow support at night, neutral spine.    Patient was instructed in and issued a HEP for: head nod, L UT stretch/opening, seated thoracic rot    Charges: PT Eval Moderate Complexity, TE x 1 10'  man x 1 12'      Total Timed Treatment: 22 min     Total Treatment Time: 37 min     Based on the clinical presentation, examination and history, this evaluation shows involvement of 3-4 body structures involved / activity limitations, 1-2 personal factors / comorbidities and the condition is evolving due to changing clinical characteristics indicating moderate complexity.     PLAN OF CARE:    Goals: (to be met in 8 visits)   (I) with HEP  Demonstrate proper sitting/standing posture to help decrease neck strain.  Bilat cervical rotation at least 60 deg for easier head turning.  Able to tolerate working at the computer 8 hour work day with decreased pain to 2/10 levels.  Able to sleep uninterrupted from pain.   No c/o dizziness with all ADL's and position changes  Decreased headache frequency to no more than 1-2/ month.      Frequency / Duration: Patient will be seen for 1-2 x/week or a total of 8 visits over a 90 day period. Treatment will include: Manual Therapy, Neuromuscular Re-education, Therapeutic Activities, Therapeutic Exercise, and Home Exercise Program instruction    Education or treatment limitation: None  Rehab Potential:good    Neck Disability Index Score  Score: 32 % (3/19/2024  4:53 PM)      Patient/Family/Caregiver was advised of these findings, precautions, and treatment options and has agreed to actively participate in planning and for this course of  care.    Thank you for your referral. Please co-sign or sign and return this letter via fax as soon as possible to [phone]. If you have any questions, please contact me at Dept: [phone]    Sincerely,  Electronically signed by therapist: Sabrina Jarrett PT    Physician's certification required: Yes  I certify the need for these services furnished under this plan of treatment and while under my care.    X___________________________________________________ Date____________________    Certification From: 3/18/2024  To:6/16/2024

## 2024-03-19 ENCOUNTER — OFFICE VISIT (OUTPATIENT)
Dept: PHYSICAL THERAPY | Age: 44
End: 2024-03-19
Attending: FAMILY MEDICINE
Payer: COMMERCIAL

## 2024-03-19 DIAGNOSIS — M50.90 CERVICAL NECK PAIN WITH EVIDENCE OF DISC DISEASE: ICD-10-CM

## 2024-03-19 DIAGNOSIS — M47.812 SPONDYLOSIS OF CERVICAL REGION WITHOUT MYELOPATHY OR RADICULOPATHY: Primary | ICD-10-CM

## 2024-03-19 PROCEDURE — 97110 THERAPEUTIC EXERCISES: CPT

## 2024-03-19 PROCEDURE — 97162 PT EVAL MOD COMPLEX 30 MIN: CPT

## 2024-03-19 PROCEDURE — 97140 MANUAL THERAPY 1/> REGIONS: CPT

## 2024-03-21 ENCOUNTER — APPOINTMENT (OUTPATIENT)
Dept: PHYSICAL THERAPY | Age: 44
End: 2024-03-21
Attending: FAMILY MEDICINE
Payer: COMMERCIAL

## 2024-04-01 NOTE — PROGRESS NOTES
Diagnosis:   Spondylosis of cervical region without myelopathy or radiculopathy (M47.812)  Cervical neck pain with evidence of disc disease (M50.90)     Left Referring Provider: Sarah Munoz  Date of Evaluation:    3/19/24    Precautions:  Latex Allergy Next MD visit:   none scheduled  Date of Surgery: n/a   Insurance Primary/Secondary: [employer] OUT OF STATE / N/A     # Auth Visits: 8            Subjective: No dizziness.  Neck pain has also been better.  Holding the phone in L hand.will get pain L upper arm  Pain:  neck pain decreased to 6/10 levels.   3/10, at best 3/10, at worst 9/10.      Objective:   (+) L>R tenderness multiple muscle groups, UT, levator. Significant tenderness throughout facets L>R  Neck Disability Index Score  Score: 32 % (3/19/2024  4:53 PM)       Flexion:         30    Extension  30  *pain       Right               Left   rotation  60*pain       45*pain    SB  30                20*pain      Assessment: Pt with cervicogenic dizziness; dizziness has resolved past 3 weeks but can still be aggravated with increased activity, upper body workouts, or prolonged sitting/computer.  Cervical pain overall improved.  Good follow through with ergonomic considerations. Relief of pain; L UE symptoms post session.         Goals:    (to be met in 8 visits)   (I) with HEP  Demonstrate proper sitting/standing posture to help decrease neck strain.  Bilat cervical rotation at least 60 deg for easier head turning.  Able to tolerate working at the computer 8 hour work day with decreased pain to 2/10 levels.  Able to sleep uninterrupted from pain.   No c/o dizziness with all ADL's and position changes  Decreased headache frequency to no more than 1-2/ month.    Plan: Patient will be seen for 1-2 x/week or a total of 8 visits over a 90 day period. Treatment will include: Manual Therapy, Neuromuscular Re-education, Therapeutic Activities, Therapeutic Exercise, and Home Exercise Program instruction  Date: 4/1/2024  TX#:  2/8 Date:                 TX#: 3/ Date:                 TX#: 4/ Date:                 TX#: 5/ Date:   Tx#: 6/   Manual   Cervical distraction 60\" holds  SOR  60\"x3  L UT STM  Pect minor release       NR  Head nod supine 10x  L median nerve glides low level 20x       Ergo review/posture, stretch breaks, neutral spine              HEP: head nod, L UT stretch/opening, seated thoracic rot       Charges: man x 2 25'  NR x 1 15'       Total Timed Treatment: 40 min  Total Treatment Time: 40 min

## 2024-04-02 ENCOUNTER — OFFICE VISIT (OUTPATIENT)
Dept: PHYSICAL THERAPY | Age: 44
End: 2024-04-02
Attending: FAMILY MEDICINE
Payer: COMMERCIAL

## 2024-04-02 PROCEDURE — 97112 NEUROMUSCULAR REEDUCATION: CPT

## 2024-04-02 PROCEDURE — 97140 MANUAL THERAPY 1/> REGIONS: CPT

## 2024-04-04 ENCOUNTER — APPOINTMENT (OUTPATIENT)
Dept: PHYSICAL THERAPY | Age: 44
End: 2024-04-04
Attending: FAMILY MEDICINE
Payer: COMMERCIAL

## 2024-04-09 ENCOUNTER — OFFICE VISIT (OUTPATIENT)
Dept: PHYSICAL THERAPY | Age: 44
End: 2024-04-09
Attending: FAMILY MEDICINE
Payer: COMMERCIAL

## 2024-04-09 PROCEDURE — 97140 MANUAL THERAPY 1/> REGIONS: CPT

## 2024-04-09 PROCEDURE — 97110 THERAPEUTIC EXERCISES: CPT

## 2024-04-09 NOTE — PROGRESS NOTES
Diagnosis:   Spondylosis of cervical region without myelopathy or radiculopathy (M47.812)  Cervical neck pain with evidence of disc disease (M50.90)     Left Referring Provider: Sarah Munoz  Date of Evaluation:    3/19/24    Precautions:  Latex Allergy Next MD visit:   none scheduled  Date of Surgery: n/a   Insurance Primary/Secondary: BC[employer] OUT OF STATE / N/A     # Auth Visits: [id number]            Subjective: Dizziness is gone. Working out with  2x/week, keeping the weights lights, limited to 5 lbs.   Pt notes how stress contributes to her pain.  Trying to take sitting breaks at work and not sit for too long.    Pain:  neck pain decreased to 6/10 levels.   3/10, at best 3/10, at worst 9/10.      Objective:   Flat thoracic spine, increased flex CT junction  (+) L>R tenderness multiple muscle groups, UT, levator. Significant tenderness throughout facets L>R  Neck Disability Index Score  Score: 32 % (3/19/2024  4:53 PM)       Flexion:         30    Extension  30  *pain       Right               Left   rotation  60*pain       45*pain    SB  30                20*pain      Assessment: Correlation of pain and stress contributing. Pt ed relaxation techniques, NS sensitivity and effect of exercises including cardio component.  Good response of pain relief to postural related stretches.   Pt with cervicogenic dizziness; dizziness has resolved past 3 weeks but can still be aggravated with increased activity, upper body workouts, or prolonged sitting/computer.  Cervical pain overall improved.  Good follow through with ergonomic considerations. Relief of pain; L UE symptoms post session.         Goals:    (to be met in 8 visits)   (I) with HEP  Demonstrate proper sitting/standing posture to help decrease neck strain.  Bilat cervical rotation at least 60 deg for easier head turning.  Able to tolerate working at the computer 8 hour work day with decreased pain to 2/10 levels.  Able to sleep uninterrupted from pain.   No c/o  dizziness with all ADL's and position changes  Decreased headache frequency to no more than 1-2/ month.    Plan: Foam roll pect stretch.  Patient will be seen for 1-2 x/week or a total of 8 visits over a 90 day period. Treatment will include: Manual Therapy, Neuromuscular Re-education, Therapeutic Activities, Therapeutic Exercise, and Home Exercise Program instruction  Date: 4/1/2024  TX#: 2/8 Date:    4/9/24             TX#: 3/ Date:                 TX#: 4/ Date:                 TX#: 5/ Date:   Tx#: 6/   Manual   Cervical distraction 60\" holds  SOR  60\"x3  L UT STM  Pect minor release TE  UBE 5'  Cable 3 plates 10x2  Rows  Bilat ext 1 plate 10x2  Wall slide towel thoracic 10x  Wall push up neutral spine 10x2      NR  Head nod supine 10x  L median nerve glides low level 20x 3 way child's pose review  1/2 foam roll:  -Relaxation techniques  -Saws at 90 flex and at side 1' each.   -chin tuck 5x        Ergo review/posture, stretch breaks, neutral spine Manual   Cervical distraction 60\" holds  SOR  60\"x3  Pect minor release 30\"x3             HEP: head nod, L UT stretch/opening, seated thoracic rot       Charges:  TE x 2 30'  man x 1 15'       Total Timed Treatment: 45 min  Total Treatment Time: 45 min

## 2024-04-23 ENCOUNTER — OFFICE VISIT (OUTPATIENT)
Dept: PHYSICAL THERAPY | Age: 44
End: 2024-04-23
Attending: FAMILY MEDICINE
Payer: COMMERCIAL

## 2024-04-23 PROCEDURE — 97140 MANUAL THERAPY 1/> REGIONS: CPT

## 2024-04-23 PROCEDURE — 97112 NEUROMUSCULAR REEDUCATION: CPT

## 2024-04-23 NOTE — PROGRESS NOTES
Diagnosis:   Spondylosis of cervical region without myelopathy or radiculopathy (M47.812)  Cervical neck pain with evidence of disc disease (M50.90)     Left Referring Provider: Sarah Munoz  Date of Evaluation:    3/19/24    Precautions:  Latex Allergy Next MD visit:   none scheduled  Date of Surgery: n/a   Insurance Primary/Secondary: BCBS OUT OF STATE / N/A     # Auth Visits: 8            Subjective: Had to sit 6 hours in the car yesterday so feels the stiffness.  Dizziness is gone. Has been working with the  going well, limiting the weights to 5 lbs.    Pain:  neck pain decreased to 6/10 levels.   3/10, at best 3/10, at worst 9/10.      Objective:   Rot R 65 deg, L 55 deg  Flat thoracic spine, increased flex CT junction  (+) L>R tenderness multiple muscle groups, UT, levator. Significant tenderness throughout facets L>R  Neck Disability Index Score  Score: 32 % (3/19/2024  4:53 PM)     Assessment: Pt overall pleased with progress.   Nearing d/c; has 1 more visit scheduled.   Excellent follow through with HEP. Most challenging includes taking breaks from sitting/driving tasks.     Correlation of pain and stress contributing. Pt ed relaxation techniques, NS sensitivity and effect of exercises including cardio component.  Good response of pain relief to postural related stretches.   Pt with cervicogenic dizziness; dizziness has resolved past 3 weeks but can still be aggravated with increased activity, upper body workouts, or prolonged sitting/computer.  Cervical pain overall improved.  Good follow through with ergonomic considerations. Relief of pain; L UE symptoms post session.         Goals:    (to be met in 8 visits)   (I) with HEP  Demonstrate proper sitting/standing posture to help decrease neck strain.  Bilat cervical rotation at least 60 deg for easier head turning.  Able to tolerate working at the computer 8 hour work day with decreased pain to 2/10 levels.  Able to sleep uninterrupted from pain.    No c/o dizziness with all ADL's and position changes  Decreased headache frequency to no more than 1-2/ month.    Plan: Foam roll pect stretch.  Patient will be seen for 1-2 x/week or a total of 8 visits over a 90 day period. Treatment will include: Manual Therapy, Neuromuscular Re-education, Therapeutic Activities, Therapeutic Exercise, and Home Exercise Program instruction  Date: 4/1/2024  TX#: 2/8 Date:    4/9/24             TX#: 3/ Date: 4/23/24                TX#: 4/8 Date:                 TX#: 5/ Date:   Tx#: 6/   Manual   Cervical distraction 60\" holds  SOR  60\"x3  L UT STM  Pect minor release TE  UBE 5'  Cable 3 plates 10x2  Rows  Bilat ext 1 plate 10x2  Wall slide towel thoracic 10x  Wall push up neutral spine 10x2 NR  Neutral spine train  Wall push up 10x2  Wall slide 10x2  Posture back on wall  -scap sets 10x  -90/90  hold  Cervical proprioception: target with cervical rot EC  Head nod towel roll for SOR 10x               NR  Head nod supine 10x  L median nerve glides low level 20x 3 way child's pose review  1/2 foam roll:  -Relaxation techniques  -Saws at 90 flex and at side 1' each.   -chin tuck 5x   Manual   Cervical distraction 60\" holds  SOR  60\"x3  -SOR towel roll home  Pect minor release 30\"x3     Ergo review/posture, stretch breaks, neutral spine Manual   Cervical distraction 60\" holds  SOR  60\"x3  Pect minor release 30\"x3             HEP: head nod, L UT stretch/opening, seated thoracic rot       Charges:  NR x 2 28'  man x 1 15'       Total Timed Treatment: 43 min  Total Treatment Time: 43 min

## 2024-04-25 ENCOUNTER — APPOINTMENT (OUTPATIENT)
Dept: PHYSICAL THERAPY | Age: 44
End: 2024-04-25
Attending: FAMILY MEDICINE
Payer: COMMERCIAL

## 2024-04-29 ENCOUNTER — OFFICE VISIT (OUTPATIENT)
Dept: FAMILY MEDICINE CLINIC | Facility: CLINIC | Age: 44
End: 2024-04-29
Payer: COMMERCIAL

## 2024-04-29 VITALS
SYSTOLIC BLOOD PRESSURE: 98 MMHG | DIASTOLIC BLOOD PRESSURE: 56 MMHG | RESPIRATION RATE: 20 BRPM | BODY MASS INDEX: 30 KG/M2 | OXYGEN SATURATION: 99 % | WEIGHT: 176 LBS | TEMPERATURE: 97 F | HEART RATE: 83 BPM

## 2024-04-29 DIAGNOSIS — G47.10 HYPERSOMNIA: ICD-10-CM

## 2024-04-29 DIAGNOSIS — Z23 NEED FOR VACCINATION: ICD-10-CM

## 2024-04-29 DIAGNOSIS — R06.83 SNORING: ICD-10-CM

## 2024-04-29 DIAGNOSIS — R53.83 OTHER FATIGUE: ICD-10-CM

## 2024-04-29 DIAGNOSIS — G47.30 SLEEP APNEA, UNSPECIFIED TYPE: Primary | ICD-10-CM

## 2024-04-29 PROCEDURE — 3078F DIAST BP <80 MM HG: CPT | Performed by: FAMILY MEDICINE

## 2024-04-29 PROCEDURE — 3074F SYST BP LT 130 MM HG: CPT | Performed by: FAMILY MEDICINE

## 2024-04-29 PROCEDURE — 99214 OFFICE O/P EST MOD 30 MIN: CPT | Performed by: FAMILY MEDICINE

## 2024-04-29 NOTE — PROGRESS NOTES
CHIEF COMPLAINT:   Chief Complaint   Patient presents with    Sleep Problem     Patient states that her family says she is snoring a lot. She has been getting startled in the middle of the night and waking up tired in the morning         HPI:     Marni Edwards is a 43 year old female presents for concern of loud snoring 's complaint and noticed she has stopped breathing at night.  Patient has woken up feeling short of breath.  She always feels tired.  Has some hypersomnia where she sits down in the afternoon she can fall asleep easily.  Has never fallen asleep while driving.  States friends of hers were recently diagnosed with sleep apnea and are compliant with her CPAP machine and it has changed their lives.  Patient states she sleeps with her mouth open and her mouth is always dry.  She understands that she loses weight it is possible it may go away.  Patient is requesting evaluation.  Denies feeling depressed, sad, hopeless, SI or HI      HISTORY:  Past Medical History:    Osteoarthritis    rheumatory    Palpitations    Patient denies medical problems    Tubal pregnancy (HCC)      Past Surgical History:   Procedure Laterality Date    Other surgical history  2012    cervical polypectomy : benign    Tubal ligation  2011    left tubal ligation (right salpingectomy)      Family History   Problem Relation Age of Onset    Breast Cancer Mother 61    Cancer Mother         Breast cancer diagnosis in 2018    Hypertension Mother     Hypertension Father     Hypertension Maternal Grandmother     Hypertension Maternal Grandfather     No Known Problems Paternal Grandmother     No Known Problems Paternal Grandfather       Social History:   Social History     Socioeconomic History    Marital status:    Occupational History    Occupation:    Tobacco Use    Smoking status: Never    Smokeless tobacco: Never   Vaping Use    Vaping status: Never Used   Substance and Sexual Activity    Alcohol use:  Yes     Alcohol/week: 2.0 standard drinks of alcohol     Types: 2 Standard drinks or equivalent per week     Comment: social    Drug use: No    Sexual activity: Yes     Partners: Male   Other Topics Concern    Caffeine Concern No    Exercise Yes    Seat Belt Yes     Social Determinants of Health      [facility]    Social Connections        Medications (Active prior to today's visit):  Current Outpatient Medications   Medication Sig Dispense Refill    Terconazole 0.8 % Vaginal Cream Place vaginally every 4 (four) hours. AT BEDTIME      Levonorgestrel (MIRENA, 52 MG,) 20 MCG/DAY Intrauterine IUD 20 mcg (1 each total) by Intrauterine route one time.         Allergies:  Allergies   Allergen Reactions    Avocado PAIN     cramping    Gluten Flour RASH    Other PAIN     Agave syrup     Pineapple Juice PAIN    Latex OTHER (SEE COMMENTS)     Skin burning sensation        PSFH elements reviewed from today and agreed except as otherwise stated in HPI.  PHYSICAL EXAM:   BP 98/56 (BP Location: Left arm, Patient Position: Sitting, Cuff Size: adult)   Pulse 83   Temp 97.3 °F (36.3 °C) (Temporal)   Resp 20   Wt 176 lb (79.8 kg)   LMP  (LMP Unknown)   SpO2 99%   BMI 30.21 kg/m²   Vital signs reviewed.Appears stated age, well groomed.  Physical Exam   GENERAL: well developed, well nourished,in no apparent distress  EYES; PERRLA, EOM-i B/L. Sclera clear and non icteric bilateral  SKIN: no rashes,no suspicious lesions  HEENT: atraumatic, normocephalic, oral pharynx Mallampati score 3-4  NECK: supple,no adenopathy,no bruits, no JVD  LUNGS: clear to auscultation bilateral. No RRW. Good inspiratory and expiratory effort  CARDIO: RRR without murmur, clear S1, S2  VS: no carotid artery bruit bilateral.    GI: good BS's,no masses,No HSM or tenderness.   EXTREMITIES: no cyanosis, clubbing or edema, bilateral. No calf tenderness    LABS        REVIEWED THIS  VISIT  ASSESSMENT/PLAN:   43 year old female with    1. Sleep apnea, unspecified type  2. Snoring  3. BMI 30.0-30.9,adult  4. Other fatigue  5. Hypersomnia  - Home Sleep Apnea Test (Adult pt only) - Sleep consult required for Medicare pts  - General sleep study; Future  -Patient with classic symptoms of untreated sleep apnea.  -Needs sleep study to confirm diagnosis and to further treat  -The long-term consequences of untreated obstructive sleep apnea which can cause or exacerbate a number of medical comorbidities, including increasing the risk of hypertension, cor pulmonale, CHF, arrhythmias, and sudden death and worsening glycemic control in diabetics.  KYLIE can also increase overall incidence of coronary disease and stroke and it genuinely increases the risk of developing dementia and exacerbates the degree of cognitive dysfunction in mild cognitive impairment.  -Possible patient can lose weight and apnea will not go away since oropharynx is narrow but may try.  Other benefits to losing weight for help in preventing prediabetes diabetes, heart disease stroke, heart attack and cancer  If positive for obstructive sleep apnea then patient, will be contacted by sleep specialist per protocol.  If negative, may follow-up in office.    6. Need for vaccination  - Urine Preg Test  - MMR [30600] #2  Can schedule nurse visit to perform MMR and urine pregnant test-does have Mirena IUD but still needs to have a negative pregnancy to proceed with a live vaccine.  -Had at least 1 MMR positive titers in 2018 for MMR.  Did have mumps only.  No vaccination records from [facility].      Meds This Visit:  Requested Prescriptions      No prescriptions requested or ordered in this encounter       Health Maintenance:  Health Maintenance   Topic Date Due    COVID-19 Vaccine (5 - 2023-24 season) 05/29/2024 (Originally 9/1/2023)    Annual Physical  09/29/2024    Mammogram  10/21/2024    Colorectal Cancer Screening  11/30/2026    Pap Smear   09/09/2027    DTaP,Tdap,and Td Vaccines (2 - Td or Tdap) 01/11/2028    Influenza Vaccine  Completed    Annual Depression Screening  Completed    Pneumococcal Vaccine: Birth to 64yrs  Aged Out         Patient/Caregiver Education: Patient/Caregiver Education: There are no barriers to learning. Medical education done.   Outcome: Patient verbalizes understanding. Patient is notified to call with any questions, comp lications, allergies, or worsening or changing symptoms.  Patient is to call with any side effects or complications from the treatments as a result of today.     Problem List:     Patient Active Problem List   Diagnosis    Primary osteoarthritis of both hands    Dyslipidemia (high LDL; low HDL)    Vitamin D deficiency    Left cervical radiculopathy    Cervical neck pain with evidence of disc disease    Tension headache    Benign paroxysmal positional vertigo of right ear    History of endometrial biopsy 2016 neg, 2013 neg    Dyspareunia in female    Yeast infection    Recurrent vaginitis    Dense breast tissue on mammogram    Spondylosis of cervical region without myelopathy or radiculopathy    BMI 29.0-29.9,adult       Imaging & Referrals:  None     4/29/2024  Sarah Munoz, DO      Patient understands plan and follow-up.  Return in about 5 months (around 9/29/2024) for annual physical, fasting labs AM, sooner if needed.  Will MyChart results from sleep study .

## 2024-05-09 ENCOUNTER — APPOINTMENT (OUTPATIENT)
Dept: PHYSICAL THERAPY | Age: 44
End: 2024-05-09
Attending: FAMILY MEDICINE
Payer: COMMERCIAL

## 2024-06-06 ENCOUNTER — TELEPHONE (OUTPATIENT)
Dept: FAMILY MEDICINE CLINIC | Facility: CLINIC | Age: 44
End: 2024-06-06

## 2024-06-06 ENCOUNTER — HOSPITAL ENCOUNTER (OUTPATIENT)
Age: 44
Discharge: HOME | End: 2024-06-06
Payer: COMMERCIAL

## 2024-06-06 VITALS
TEMPERATURE: 97 F | BODY MASS INDEX: 29.02 KG/M2 | HEART RATE: 79 BPM | HEIGHT: 64 IN | SYSTOLIC BLOOD PRESSURE: 109 MMHG | DIASTOLIC BLOOD PRESSURE: 82 MMHG | OXYGEN SATURATION: 97 % | RESPIRATION RATE: 20 BRPM | WEIGHT: 170 LBS

## 2024-06-06 DIAGNOSIS — R42 VERTIGO: ICD-10-CM

## 2024-06-06 DIAGNOSIS — J01.40 ACUTE PANSINUSITIS, RECURRENCE NOT SPECIFIED: Primary | ICD-10-CM

## 2024-06-06 DIAGNOSIS — H92.02 LEFT EAR PAIN: ICD-10-CM

## 2024-06-06 PROCEDURE — 99203 OFFICE O/P NEW LOW 30 MIN: CPT | Performed by: NURSE PRACTITIONER

## 2024-06-06 RX ORDER — AMOXICILLIN AND CLAVULANATE POTASSIUM 875; 125 MG/1; MG/1
1 TABLET, FILM COATED ORAL 2 TIMES DAILY
Qty: 20 TABLET | Refills: 0 | Status: SHIPPED | OUTPATIENT
Start: 2024-06-06 | End: 2024-06-16

## 2024-06-06 RX ORDER — MECLIZINE HYDROCHLORIDE 25 MG/1
25 TABLET ORAL 3 TIMES DAILY PRN
Qty: 30 TABLET | Refills: 0 | Status: SHIPPED | OUTPATIENT
Start: 2024-06-06

## 2024-06-06 NOTE — DISCHARGE INSTRUCTIONS
Please take Augmentin as prescribed.  Meclizine every 8 hours for the next 3 days.  Drink plenty of fluids and change positions slowly.  Continue naproxen.  I also recommend over-the-counter antihistamines like Zyrtec and Flonase daily.  Follow closely with your primary care provider.  ER if worse.

## 2024-06-06 NOTE — ED INITIAL ASSESSMENT (HPI)
Patient 4 days of left ear pain and c/o of dizziness with movement.;  Sinus pressure and congestion

## 2024-06-06 NOTE — ED PROVIDER NOTES
Patient Seen in: [facility]      History     Chief Complaint   Patient presents with    Ear Problem     Feeling dizziness since yesterday morning (room spins and nauseated]and ear pressure and pressure on head and face - Entered by patient    Ear Pain     Stated Complaint: Ear Problem - Feeling dizziness since yesterday morning (room spins and nauseat*    Subjective:   This is a 43-year-old female with a history of osteoarthritis and vertigo.  Presents to immediate care reporting greater than 1 week of nasal congestion with sinus pain and pressure.  Frontal headaches.  Yesterday she developed a pain in her left ear.  She reports a sensation of dizziness like the room is spinning that is worse with change of position.  Feels like previous bouts of vertigo.  States she feels nauseous but no vomiting.  No fevers.  Taking naproxen with some relief.  Denies any tinnitus or pulsatile sensations in the ear.    The history is provided by the patient.           Objective:   Past Medical History:    Osteoarthritis    rheumatory    Palpitations    Patient denies medical problems    Tubal pregnancy (HCC)              Past Surgical History:   Procedure Laterality Date    Other surgical history  2012    cervical polypectomy : benign    Tubal ligation  2011    left tubal ligation (right salpingectomy)                Social History     Socioeconomic History    Marital status:    Occupational History    Occupation:    Tobacco Use    Smoking status: Never     Passive exposure: Never    Smokeless tobacco: Never   Vaping Use    Vaping status: Never Used   Substance and Sexual Activity    Alcohol use: Yes     Alcohol/week: 2.0 standard drinks of alcohol     Types: 2 Standard drinks or equivalent per week     Comment: social    Drug use: No    Sexual activity: Yes     Partners: Male   Other Topics Concern    Caffeine Concern No    Exercise Yes    Seat Belt Yes     Social Determinants of Health       [facility]    Social Connections              Review of Systems   Constitutional:  Negative for chills and fever.   HENT:  Positive for congestion, sinus pressure and sinus pain. Negative for sore throat.    Respiratory:  Negative for cough, shortness of breath and wheezing.    Cardiovascular:  Negative for chest pain, palpitations and leg swelling.   Gastrointestinal:  Positive for nausea. Negative for abdominal pain, constipation, diarrhea and vomiting.   Genitourinary:  Negative for dysuria.   Musculoskeletal:  Negative for back pain, neck pain and neck stiffness.   Skin:  Negative for rash.   Allergic/Immunologic: Negative for environmental allergies.   Neurological:  Positive for dizziness and headaches. Negative for tremors, seizures, syncope, facial asymmetry, speech difficulty, weakness, light-headedness and numbness.   Hematological:  Does not bruise/bleed easily.       Positive for stated complaint: Ear Problem - Feeling dizziness since yesterday morning (room spins and nauseat*  Other systems are as noted in HPI.  Constitutional and vital signs reviewed.      All other systems reviewed and negative except as noted above.    Physical Exam     ED Triage Vitals [06/06/24 1320]   BP 94/61   Pulse 79   Resp 20   Temp 97.3 °F (36.3 °C)   Temp src Temporal   SpO2 97 %   O2 Device None (Room air)       Current Vitals:   Vital Signs  BP: 109/82  Pulse: 79  Resp: 20  Temp: 97.3 °F (36.3 °C)  Temp src: Temporal    Oxygen Therapy  SpO2: 97 %  O2 Device: None (Room air)            Physical Exam  Vitals and nursing note reviewed.   Constitutional:       General: She is not in acute distress.     Appearance: Normal appearance. She is not ill-appearing, toxic-appearing or diaphoretic.   HENT:      Head: Normocephalic and atraumatic.      Right Ear: Hearing, tympanic membrane, ear canal and external ear normal. Tympanic membrane is not injected,  scarred, perforated, erythematous or retracted.      Left Ear: Hearing, ear canal and external ear normal. A middle ear effusion is present. Tympanic membrane is not injected, scarred, perforated, erythematous or retracted.      Nose: Congestion and rhinorrhea present.      Mouth/Throat:      Mouth: Mucous membranes are moist.      Pharynx: Oropharynx is clear. Posterior oropharyngeal erythema present. No oropharyngeal exudate.   Eyes:      General:         Right eye: No discharge.         Left eye: No discharge.      Extraocular Movements: Extraocular movements intact.      Conjunctiva/sclera: Conjunctivae normal.   Cardiovascular:      Rate and Rhythm: Normal rate.      Heart sounds: Normal heart sounds.   Pulmonary:      Effort: Pulmonary effort is normal.      Breath sounds: Normal breath sounds.   Musculoskeletal:      Cervical back: Neck supple.      Right lower leg: No edema.      Left lower leg: No edema.   Skin:     General: Skin is warm and dry.      Capillary Refill: Capillary refill takes less than 2 seconds.      Findings: No rash.   Neurological:      General: No focal deficit present.      Mental Status: She is alert and oriented to person, place, and time.   Psychiatric:         Mood and Affect: Mood normal.         Behavior: Behavior normal.               ED Course   Labs Reviewed - No data to display          DC home         MDM        Vital signs stable.  Patient is well-appearing and nontoxic looking.  Presents to immediate care for sinus symptoms, frontal headache, left ear pain, and dizziness.    Differential diagnosis includes but is not limited to sinusitis, allergic rhinitis, otitis media, otitis externa, mastoiditis, vertigo, less likely intracranial abnormality    Left TM is intact and reveals middle ear effusion with no evidence of otitis media.  Right TM is intact and clear.  No mastoid tenderness bilaterally.  Neurological exam is unremarkable with no focal deficits. No suspicion for  acute intracranial abnormality.    Symptoms of dizziness is reproducible with rotation of the neck.  States this feels like previous bouts of vertigo.    Sinus symptoms been ongoing greater than 7 days.    Clinical impression is sinusitis that is likely exacerbating vertigo.    DC home.  Rx given for Augmentin and meclizine.  Advised to drink plenty of fluids and change positions slowly.  Recommended over-the-counter antihistamines and Flonase.  Continue naproxen for discomfort and pain.  Recommended close follow-up with PCP.  Reasons to seek emergent reevaluation reviewed.  She verbalized understanding, and agreed plan of care.  All questions answered.                              Medical Decision Making      Disposition and Plan     Clinical Impression:  1. Acute pansinusitis, recurrence not specified    2. Left ear pain    3. Vertigo         Disposition:  Discharge  6/6/2024  1:40 pm    Follow-up:  Sarah Munoz DO  [address] [facility] [address]  [phone]    In 1 week            Medications Prescribed:  Current Discharge Medication List        START taking these medications    Details   amoxicillin clavulanate 875-125 MG Oral Tab Take 1 tablet by mouth 2 (two) times daily for 10 days.  Qty: 20 tablet, Refills: 0      meclizine 25 MG Oral Tab Take 1 tablet (25 mg total) by mouth 3 (three) times daily as needed.  Qty: 30 tablet, Refills: 0

## 2024-06-06 NOTE — TELEPHONE ENCOUNTER
Spoke to patient, pt with c/o left ear pain and lots of pressure x 4 days. As of yesterday started to feel that with movement of her head the room feels like it is spinning. She also feels as if she is under water from her ear pressure. Noted some nausea last night. Would like to r/o ear infection.     Pt denies HA, no vision changes, or confusion. Has a work trip scheduled for Monday and would like evaluation.     Next available appointment with provider offered. Pt advised sooner evaluation and prefers sooner evaluation and treatment. Patient to seek evaluation at [facility].

## 2024-06-11 ENCOUNTER — OFFICE VISIT (OUTPATIENT)
Dept: FAMILY MEDICINE CLINIC | Facility: CLINIC | Age: 44
End: 2024-06-11
Payer: COMMERCIAL

## 2024-06-11 VITALS
TEMPERATURE: 98 F | HEIGHT: 64 IN | OXYGEN SATURATION: 99 % | RESPIRATION RATE: 19 BRPM | BODY MASS INDEX: 30.45 KG/M2 | HEART RATE: 99 BPM | SYSTOLIC BLOOD PRESSURE: 108 MMHG | DIASTOLIC BLOOD PRESSURE: 70 MMHG | WEIGHT: 178.38 LBS

## 2024-06-11 DIAGNOSIS — Z91.09 ENVIRONMENTAL ALLERGIES: ICD-10-CM

## 2024-06-11 DIAGNOSIS — J01.00 ACUTE NON-RECURRENT MAXILLARY SINUSITIS: ICD-10-CM

## 2024-06-11 DIAGNOSIS — H83.02 LABYRINTHITIS OF LEFT EAR: ICD-10-CM

## 2024-06-11 DIAGNOSIS — H65.02 NON-RECURRENT ACUTE SEROUS OTITIS MEDIA OF LEFT EAR: Primary | ICD-10-CM

## 2024-06-11 PROCEDURE — 99214 OFFICE O/P EST MOD 30 MIN: CPT | Performed by: FAMILY MEDICINE

## 2024-06-11 PROCEDURE — 3074F SYST BP LT 130 MM HG: CPT | Performed by: FAMILY MEDICINE

## 2024-06-11 PROCEDURE — 3078F DIAST BP <80 MM HG: CPT | Performed by: FAMILY MEDICINE

## 2024-06-11 PROCEDURE — 3008F BODY MASS INDEX DOCD: CPT | Performed by: FAMILY MEDICINE

## 2024-06-11 RX ORDER — METHYLPREDNISOLONE 4 MG/1
TABLET ORAL
Qty: 21 TABLET | Refills: 0 | Status: SHIPPED | OUTPATIENT
Start: 2024-06-11

## 2024-06-11 RX ORDER — LORATADINE 10 MG/1
10 TABLET ORAL DAILY
Qty: 30 TABLET | Refills: 0 | Status: SHIPPED | OUTPATIENT
Start: 2024-06-11 | End: 2024-07-11

## 2024-06-11 RX ORDER — NAPROXEN 500 MG/1
500 TABLET ORAL 2 TIMES DAILY WITH MEALS
COMMUNITY

## 2024-06-11 RX ORDER — FLUTICASONE PROPIONATE 50 MCG
2 SPRAY, SUSPENSION (ML) NASAL DAILY
Qty: 16 G | Refills: 5 | Status: SHIPPED | OUTPATIENT
Start: 2024-06-11 | End: 2025-06-06

## 2024-06-11 NOTE — PROGRESS NOTES
CHIEF COMPLAINT:   Chief Complaint   Patient presents with    Follow - Up     [facility] DOS 2024         HPI:     Marni Edwards is a 43 year old female presents for follow-up immediate care for vertigo.  Date of service was 2024 at the [facility] ICU.  Patient presented with a week history of sinus pain and nasal congestion and sinus pressure.  She then developed left ear pain and pressure and vertigo with room spinning and nausea.  Patient was diagnosed with acute pansinusitis, left ear pain and vertigo and was discharged home on Augmentin x 10 days and meclizine.    Today-states sinus pressure is better on day 4 of 10 of Augmentin.  Still feeling dizzy and having vertigo and left ear pressure.  Ear feels clogged.  Denies any headache or fever.  Has history of environmental allergies has used loratadine in the past.  Typically has allergy symptoms with changes of season from spring to summer and summer to fall.  Currently not taking any allergy medications.  Feels balance is slightly off.  Occasional nausea.  Sinus congestion is much improved.  No sore throat.  No neck pain.  Does feel fatigued.      HISTORY:  Past Medical History:    Allergic rhinitis    sinus    Osteoarthritis    rheumatory    Palpitations    Patient denies medical problems    Tubal pregnancy (HCC)      Past Surgical History:   Procedure Laterality Date      2010    second childbirth    Other surgical history      cervical polypectomy : benign    Tubal ligation      left tubal ligation (right salpingectomy)      Family History   Problem Relation Age of Onset    Breast Cancer Mother 61    Cancer Mother         Breast cancer diagnosis in 2018    Hypertension Mother     Hypertension Father     Hypertension Maternal Grandmother     Hypertension Maternal Grandfather     No Known Problems Paternal Grandmother     No Known Problems Paternal Grandfather       Social History:   Social History      Socioeconomic History    Marital status:    Occupational History    Occupation: [employer]   Tobacco Use    Smoking status: Never     Passive exposure: Never    Smokeless tobacco: Never   Vaping Use    Vaping status: Never Used   Substance and Sexual Activity    Alcohol use: Yes     Alcohol/week: 1.0 standard drink of alcohol     Types: 1 Standard drinks or equivalent per week     Comment: social    Drug use: No    Sexual activity: Yes     Partners: Male   Other Topics Concern    Caffeine Concern No    Exercise Yes    Seat Belt Yes    Special Diet No    Stress Concern No    Weight Concern No     Social Determinants of Health      [facility] from [facility], [facility]    Social Connections        Medications (Active prior to today's visit):  Current Outpatient Medications   Medication Sig Dispense Refill    naproxen 500 MG Oral Tab Take 1 tablet (500 mg total) by mouth 2 (two) times daily with meals.      amoxicillin clavulanate 875-125 MG Oral Tab Take 1 tablet by mouth 2 (two) times daily for 10 days. 20 tablet 0    meclizine 25 MG Oral Tab Take 1 tablet (25 mg total) by mouth 3 (three) times daily as needed. 30 tablet 0    Terconazole 0.8 % Vaginal Cream Place vaginally every 4 (four) hours. AT BEDTIME      Levonorgestrel (MIRENA, 52 MG,) 20 MCG/DAY Intrauterine IUD 20 mcg (1 each total) by Intrauterine route one time.         Allergies:  Allergies   Allergen Reactions    Avocado PAIN     cramping    Gluten Flour RASH    Other PAIN     Agave syrup     Pineapple Juice PAIN    Latex OTHER (SEE COMMENTS)     Skin burning sensation        PSFH elements reviewed from today and agreed except as otherwise stated in HPI.  PHYSICAL EXAM:   /70 (BP Location: Left arm, Patient Position: Sitting, Cuff Size: adult)   Pulse 99   Temp 97.6 °F (36.4 °C) (Temporal)   Resp 19   Ht 5' 4\" (1.626 m)   Wt 178 lb 6.4 oz (80.9 kg)   LMP  (LMP Unknown)   SpO2 99%   BMI  30.62 kg/m²   Vital signs reviewed.Appears stated age, well groomed.  Physical Exam   General: Well-nourished, well hydrated. No acute distress. No pallor. No tachypnea. No stridor.   HEENT: normocephaly, atraumatic. Sclera clear and non icteric bilaterally.  Left TM is intact with moderate clear fluid no redness.  Right TM is intact and clear.  Good cone of light bilaterally. Tonsils are clear no exudates bilaterally. Moist mucous membranes. Cobblestoning present. Uvula midline.  Pinkish nasal turbinates slightly reddened bilateral.  Clear rhinorrhea.  Sinuses nontender.  Neck: supple. No adenopathy.   Heart: RRR without S3 or S4 murmur. Clear S1S2  Lungs: clear to auscultation bilaterally. No rales, rhonchi or wheezes. Good inspiratory and expiratory effort. No costosternal retractions.  Abdomen: soft, nontender, nondistended.  Extremities: No cyanosis, clubbing, or edema bilaterally   Skin: no acute  rashes  NEURO: AOx3.  Normal gait.  Moves all 4 extremities equally.  LABS        REVIEWED THIS VISIT  ASSESSMENT/PLAN:   43 year old female with    1. Non-recurrent acute serous otitis media of left ear  - loratadine 10 MG Oral Tab; Take 1 tablet (10 mg total) by mouth daily for 30 doses. Take once daily  Dispense: 30 tablet; Refill: 0  - fluticasone propionate 50 MCG/ACT Nasal Suspension; 2 sprays by Each Nare route daily.  Dispense: 16 g; Refill: 5  Can take 6 to 8 weeks for fluid resolved.    2. Labyrinthitis of left ear  - methylPREDNISolone 4 MG Oral Tab; Dose as generic Medrol Dosepak Take as directed with food and water  Dispense: 21 tablet; Refill: 0  Suspect inflammation of the inner ear exacerbated by inner ear fluid  Trial of steroids  Treat allergies    3. Environmental allergies  - methylPREDNISolone 4 MG Oral Tab; Dose as generic Medrol Dosepak Take as directed with food and water  Dispense: 21 tablet; Refill: 0  - loratadine 10 MG Oral Tab; Take 1 tablet (10 mg total) by mouth daily for 30 doses. Take  once daily  Dispense: 30 tablet; Refill: 0  - fluticasone propionate 50 MCG/ACT Nasal Suspension; 2 sprays by Each Nare route daily.  Dispense: 16 g; Refill: 5  - Allergens, Zone 8 [E]; Future  Suspect uncontrolled with sinus infection  Finish augmentin  Start allergy medication- loratadine and fluticasone   Risk, benefits and side effects of methyprednisolone discussed including gastritis, gastric ulcer, GI bleeding, insomnia, irritability, insomnia risk of mood changes, psychosis which is rare.  If becomes severely depressed or has any moderate to severe symptoms should call office immediately.  Prescription to be taken with food and water.  Stop and call with side effects.  Avoid any other ibuprofen, Advil, naproxen, Naprosyn OTC while taking ibuprofen.  Use Tylenol  Screen for allergies    4. Acute non-recurrent maxillary sinusitis  Finish Augmentin 4 of 10 days.  improving    Meds This Visit:  Requested Prescriptions     Signed Prescriptions Disp Refills    methylPREDNISolone 4 MG Oral Tab 21 tablet 0     Sig: Dose as generic Medrol Dosepak Take as directed with food and water    loratadine 10 MG Oral Tab 30 tablet 0     Sig: Take 1 tablet (10 mg total) by mouth daily for 30 doses. Take once daily    fluticasone propionate 50 MCG/ACT Nasal Suspension 16 g 5     Si sprays by Each Nare route daily.       Health Maintenance:  Health Maintenance   Topic Date Due    COVID-19 Vaccine ( season) 2023    Annual Physical  2024    Mammogram  10/21/2024    Colorectal Cancer Screening  2026    Pap Smear  2027    DTaP,Tdap,and Td Vaccines (2 - Td or Tdap) 2028    Influenza Vaccine  Completed    Annual Depression Screening  Completed    Pneumococcal Vaccine: Birth to 64yrs  Aged Out         Patient/Caregiver Education: Patient/Caregiver Education: There are no barriers to learning. Medical education done.   Outcome: Patient verbalizes understanding. Patient is notified to call with  any questions, comp lications, allergies, or worsening or changing symptoms.  Patient is to call with any side effects or complications from the treatments as a result of today.     Problem List:     Patient Active Problem List   Diagnosis    Primary osteoarthritis of both hands    Dyslipidemia (high LDL; low HDL)    Vitamin D deficiency    Left cervical radiculopathy    Cervical neck pain with evidence of disc disease    Tension headache    Benign paroxysmal positional vertigo of right ear    History of endometrial biopsy 2016 neg, 2013 neg    Dyspareunia in female    Yeast infection    Recurrent vaginitis    Dense breast tissue on mammogram    Spondylosis of cervical region without myelopathy or radiculopathy    BMI 29.0-29.9,adult       Imaging & Referrals:  None     6/11/2024  Sarah Munoz, DO      Patient understands plan and follow-up.  Return in about 2 weeks (around 6/25/2024), or if symptoms worsen or fail to improve.

## 2024-06-12 ENCOUNTER — LABORATORY ENCOUNTER (OUTPATIENT)
Dept: LAB | Age: 44
End: 2024-06-12
Attending: FAMILY MEDICINE
Payer: COMMERCIAL

## 2024-06-12 DIAGNOSIS — Z91.09 ENVIRONMENTAL ALLERGIES: ICD-10-CM

## 2024-06-12 PROCEDURE — 86003 ALLG SPEC IGE CRUDE XTRC EA: CPT | Performed by: FAMILY MEDICINE

## 2024-06-12 PROCEDURE — 82785 ASSAY OF IGE: CPT | Performed by: FAMILY MEDICINE

## 2024-06-13 LAB
A ALTERNATA IGE QN: <0.1 KUA/L (ref ?–0.1)
A FUMIGATUS IGE QN: <0.1 KUA/L (ref ?–0.1)
AMER SYCAMORE IGE QN: <0.1 KUA/L (ref ?–0.1)
BERMUDA GRASS IGE QN: <0.1 KUA/L (ref ?–0.1)
BOXELDER IGE QN: 5.25 KUA/L (ref ?–0.1)
C HERBARUM IGE QN: <0.1 KUA/L (ref ?–0.1)
CALIF WALNUT IGE QN: 0.2 KUA/L (ref ?–0.1)
CAT DANDER IGE QN: 0.37 KUA/L (ref ?–0.1)
CMN PIGWEED IGE QN: <0.1 KUA/L (ref ?–0.1)
COMMON RAGWEED IGE QN: 0.22 KUA/L (ref ?–0.1)
COTTONWOOD IGE QN: 0.15 KUA/L (ref ?–0.1)
D FARINAE IGE QN: 4.21 KUA/L (ref ?–0.1)
D PTERONYSS IGE QN: 2.6 KUA/L (ref ?–0.1)
DOG DANDER IGE QN: 0.12 KUA/L (ref ?–0.1)
IGE SERPL-ACNC: 263 KU/L (ref 2–214)
M RACEMOSUS IGE QN: <0.1 KUA/L (ref ?–0.1)
MARSH ELDER IGE QN: <0.1 KUA/L (ref ?–0.1)
MOUSE EPITH IGE QN: <0.1 KUA/L (ref ?–0.1)
MT JUNIPER IGE QN: 0.12 KUA/L (ref ?–0.1)
P NOTATUM IGE QN: <0.1 KUA/L (ref ?–0.1)
PECAN/HICK TREE IGE QN: <0.1 KUA/L (ref ?–0.1)
ROACH IGE QN: 0.68 KUA/L (ref ?–0.1)
SALTWORT IGE QN: <0.1 KUA/L (ref ?–0.1)
SILVER BIRCH IGE QN: 0.29 KUA/L (ref ?–0.1)
TIMOTHY IGE QN: <0.1 KUA/L (ref ?–0.1)
WHITE ASH IGE QN: <0.1 KUA/L (ref ?–0.1)
WHITE ELM IGE QN: 0.45 KUA/L (ref ?–0.1)
WHITE MULBERRY IGE QN: <0.1 KUA/L (ref ?–0.1)
WHITE OAK IGE QN: <0.1 KUA/L (ref ?–0.1)

## 2024-06-20 ENCOUNTER — TELEPHONE (OUTPATIENT)
Dept: FAMILY MEDICINE CLINIC | Facility: CLINIC | Age: 44
End: 2024-06-20

## 2024-06-20 NOTE — TELEPHONE ENCOUNTER
Spoke with patient her neck pain and dizziness continues.  She has completed physical therapy.   Would like advanced imaging.  Appointment scheduled with Dr. Cortes tomorrow as PCP is out of the office and patient did not want to wait until July for appointment.

## 2024-06-21 ENCOUNTER — OFFICE VISIT (OUTPATIENT)
Dept: FAMILY MEDICINE CLINIC | Facility: CLINIC | Age: 44
End: 2024-06-21

## 2024-06-21 VITALS
HEART RATE: 74 BPM | SYSTOLIC BLOOD PRESSURE: 126 MMHG | WEIGHT: 178.81 LBS | BODY MASS INDEX: 30.53 KG/M2 | HEIGHT: 64 IN | TEMPERATURE: 99 F | RESPIRATION RATE: 18 BRPM | OXYGEN SATURATION: 99 % | DIASTOLIC BLOOD PRESSURE: 70 MMHG

## 2024-06-21 DIAGNOSIS — M48.02 CERVICAL STENOSIS OF SPINE: Primary | ICD-10-CM

## 2024-06-21 DIAGNOSIS — M50.90 CERVICAL NECK PAIN WITH EVIDENCE OF DISC DISEASE: ICD-10-CM

## 2024-06-21 PROCEDURE — 99214 OFFICE O/P EST MOD 30 MIN: CPT | Performed by: FAMILY MEDICINE

## 2024-06-21 PROCEDURE — 3074F SYST BP LT 130 MM HG: CPT | Performed by: FAMILY MEDICINE

## 2024-06-21 PROCEDURE — 3008F BODY MASS INDEX DOCD: CPT | Performed by: FAMILY MEDICINE

## 2024-06-21 PROCEDURE — 3078F DIAST BP <80 MM HG: CPT | Performed by: FAMILY MEDICINE

## 2024-06-21 RX ORDER — CYCLOBENZAPRINE HCL 10 MG
10 TABLET ORAL NIGHTLY PRN
Qty: 30 TABLET | Refills: 0 | Status: SHIPPED | OUTPATIENT
Start: 2024-06-21

## 2024-06-26 NOTE — PROGRESS NOTES
CHIEF COMPLAINT:   Chief Complaint   Patient presents with    Neck Pain     Patient c/o of neck pain     Dizziness     Extreme dizziness for about 2 weeks          HPI:     Marni Edwards is a 43 year old female presents for neck pain.    Neck pain: has had ongoing neck pain and stiffness for awhile.  She had gone to PT, has been in this episode of neck pain x 2 weeks.  She feels a pain when she bends her head, which is a trigger.  The head pressure is gone.  She completed PT in 2024. Had MRI C-spine in  with cervical stenosis. She has no numbness/tingling/weakness in her arms or hands. She denies any injury.              HISTORY:  Past Medical History:    Allergic rhinitis    sinus    Osteoarthritis    rheumatory    Palpitations    Patient denies medical problems    Tubal pregnancy (HCC)      Past Surgical History:   Procedure Laterality Date          second childbirth    Other surgical history      cervical polypectomy : benign    Tubal ligation      left tubal ligation (right salpingectomy)      Family History   Problem Relation Age of Onset    Breast Cancer Mother 61    Cancer Mother         Breast cancer diagnosis in     Hypertension Mother     Hypertension Father     Hypertension Maternal Grandmother     Hypertension Maternal Grandfather     No Known Problems Paternal Grandmother     No Known Problems Paternal Grandfather       Social History:   Social History     Socioeconomic History    Marital status:    Occupational History    Occupation:    Tobacco Use    Smoking status: Never     Passive exposure: Never    Smokeless tobacco: Never   Vaping Use    Vaping status: Never Used   Substance and Sexual Activity    Alcohol use: Yes     Alcohol/week: 1.0 standard drink of alcohol     Types: 1 Standard drinks or equivalent per week     Comment: social    Drug use: No    Sexual activity: Yes     Partners: Male   Other Topics Concern    Caffeine Concern No     Exercise Yes    Seat Belt Yes    Special Diet No    Stress Concern No    Weight Concern No     Social Determinants of Health      Received from [facility], [facility]    Social Connections        Medications (Active prior to today's visit):  Current Outpatient Medications   Medication Sig Dispense Refill    cyclobenzaprine 10 MG Oral Tab Take 1 tablet (10 mg total) by mouth nightly as needed for Muscle spasms. 30 tablet 0    naproxen 500 MG Oral Tab Take 1 tablet (500 mg total) by mouth 2 (two) times daily with meals.      loratadine 10 MG Oral Tab Take 1 tablet (10 mg total) by mouth daily for 30 doses. Take once daily 30 tablet 0    fluticasone propionate 50 MCG/ACT Nasal Suspension 2 sprays by Each Nare route daily. 16 g 5    meclizine 25 MG Oral Tab Take 1 tablet (25 mg total) by mouth 3 (three) times daily as needed. 30 tablet 0    Terconazole 0.8 % Vaginal Cream Place vaginally every 4 (four) hours. AT BEDTIME      Levonorgestrel (MIRENA, 52 MG,) 20 MCG/DAY Intrauterine IUD 20 mcg (1 each total) by Intrauterine route one time.         Allergies:  Allergies   Allergen Reactions    Avocado PAIN     cramping    Gluten Flour RASH    Other PAIN     Agave syrup     Pineapple Juice PAIN    Latex OTHER (SEE COMMENTS)     Skin burning sensation        PSFH elements reviewed from today and agreed except as otherwise stated in HPI.  ROS:     Review of Systems   Constitutional:  Negative for appetite change, chills, fatigue and fever.   Musculoskeletal:  Positive for neck pain and neck stiffness.   Neurological:  Negative for weakness, numbness and headaches.         Pertinent positives and negatives noted in the the HPI.    PHYSICAL EXAM:   /70 (BP Location: Left arm, Patient Position: Sitting, Cuff Size: adult)   Pulse 74   Temp 98.8 °F (37.1 °C) (Temporal)   Resp 18   Ht 5' 4\" (1.626 m)   Wt 178 lb 12.8 oz (81.1 kg)   LMP  (LMP Unknown)   SpO2 99%   BMI 30.69 kg/m²    Vital signs reviewed.Appears stated age, well groomed.  Physical Exam  Vitals reviewed.   Constitutional:       General: She is in acute distress.      Appearance: Normal appearance.   HENT:      Head: Normocephalic.   Cardiovascular:      Rate and Rhythm: Regular rhythm. Tachycardia present.   Musculoskeletal:      Comments: Back exam: spine is in-line and intact, no step-offs  ParaCervical muscles with + spasm   Bilateral  strength 5/5   Skin:     General: Skin is warm and dry.   Neurological:      Mental Status: She is alert and oriented to person, place, and time.   Psychiatric:         Behavior: Behavior normal.          LABS     Laboratory Encounter on 06/12/2024   Component Date Value    Allergen A. Alternata 06/12/2024 <0.10     Allergen Aspergillus Fum* 06/12/2024 <0.10     Allergen [place] Grass 06/12/2024 <0.10     Allergen [place] 06/12/2024 0.29 (H)     Allergen [place] 06/12/2024 5.25 (H)     Allergen C. Herbarum 06/12/2024 <0.10     Allergen Cat Dander 06/12/2024 0.37 (H)     Allergen Cockroach 06/12/2024 0.68 (H)     Allergen Common Pigweed 06/12/2024 <0.10     Allergen Common Ragweed 06/12/2024 0.22 (H)     Allergen [place] Tree 06/12/2024 0.15 (H)     Allergen D. Farinae 06/12/2024 4.21 (H)     Allergen D. Pteronyssinus 06/12/2024 2.60 (H)     Allergen Dog Dander 06/12/2024 0.12 (H)     Allergen [place]m Tree 06/12/2024 0.45 (H)     Allergen Gabriel Elder 06/12/2024 <0.10     Allergen Mouse Epithelium 06/12/2024 <0.10     Allergen [place] 06/12/2024 0.12 (H)     Allergen Mucor Racemosus 06/12/2024 <0.10     Allergen [place] 06/12/2024 <0.10     Allergen [place] Tree 06/12/2024 <0.10     Allergen Pecan/[place] 06/12/2024 <0.10     Allergen Penicillium Not* 06/12/2024 <0.10     Allergen Russian Thistle 06/12/2024 <0.10     Allergen [place] 06/12/2024 <0.10     Allergen [place] Grass 06/12/2024 <0.10     Allergen [place] Tree 06/12/2024 0.20 (H)     Allergen [place] 06/12/2024 <0.10      Immunoglobulin E 06/12/2024 263.00 (H)       REVIEWED THIS VISIT  ASSESSMENT/PLAN:   43 year old female with    1. Cervical stenosis of spine  - cont OTC analgesics prn  - START Cyclobenzaprine at bedtime prn, R/b/SE of new med d/w pt  - supportive care d/w pt  - cyclobenzaprine 10 MG Oral Tab; Take 1 tablet (10 mg total) by mouth nightly as needed for Muscle spasms.  Dispense: 30 tablet; Refill: 0  - MRI SPINE CERVICAL (CPT=72141); Future    2. Cervical neck pain with evidence of disc disease  - See above.  - given persistent and worsening neck pain, will rpt the MR C- spine (last was in 2018)    - cyclobenzaprine 10 MG Oral Tab; Take 1 tablet (10 mg total) by mouth nightly as needed for Muscle spasms.  Dispense: 30 tablet; Refill: 0  - MRI SPINE CERVICAL (CPT=72141); Future      Meds This Visit:  Requested Prescriptions     Signed Prescriptions Disp Refills    cyclobenzaprine 10 MG Oral Tab 30 tablet 0     Sig: Take 1 tablet (10 mg total) by mouth nightly as needed for Muscle spasms.       Health Maintenance:  Health Maintenance   Topic Date Due    COVID-19 Vaccine (5 - 2023-24 season) 09/01/2023    Annual Physical  09/29/2024    Mammogram  10/21/2024    Colorectal Cancer Screening  11/30/2026    Pap Smear  09/09/2027    DTaP,Tdap,and Td Vaccines (2 - Td or Tdap) 01/11/2028    Influenza Vaccine  Completed    Annual Depression Screening  Completed    Pneumococcal Vaccine: Birth to 64yrs  Aged Out         Patient/Caregiver Education: There are no barriers to learning. Medical education done.   Outcome: Patient verbalizes understanding and agrees with plan. Advised to call or RTC if symptoms persist or worsen.    Problem List:     Patient Active Problem List   Diagnosis    Primary osteoarthritis of both hands    Dyslipidemia (high LDL; low HDL)    Vitamin D deficiency    Left cervical radiculopathy    Cervical neck pain with evidence of disc disease    Tension headache    Benign paroxysmal positional vertigo of right  ear    Non-recurrent acute serous otitis media of left ear    History of endometrial biopsy 2016 neg, 2013 neg    Dyspareunia in female    Yeast infection    Recurrent vaginitis    Dense breast tissue on mammogram    Spondylosis of cervical region without myelopathy or radiculopathy    BMI 29.0-29.9,adult    Environmental allergies    Labyrinthitis of left ear    Acute non-recurrent maxillary sinusitis       Imaging & Referrals:  MRI SPINE CERVICAL (CPT=72141)     6/26/2024  Trudy Cortes MD      Patient understands plan and follow-up.  Return if symptoms worsen or fail to improve, for follow-up depending on lab results.

## 2024-08-09 ENCOUNTER — TELEPHONE (OUTPATIENT)
Dept: CASE MANAGEMENT | Age: 44
End: 2024-08-09

## 2024-08-09 NOTE — TELEPHONE ENCOUNTER
Hi Dr. Cortes,    I submitted the prior auth for Marni's MRI of the cervical spine, and this has pended to a P2P status.    If you or a clinical staff member are available, you can call Loretta - [phone] and use MEM ID: [id number] as your reference.    She is scheduled for 8/12/2024 @ 9:00 a.m.    Thank you  Valerie JACK

## 2024-08-15 NOTE — TELEPHONE ENCOUNTER
52224 CPT®-MRI, CERV SPINE (Procedure Code)          Action code:  A1-Certified in total            Authorization code:  [id number]            Service from:  8/7/2024            Service to:  10/5/2024

## 2024-08-16 ENCOUNTER — TELEPHONE (OUTPATIENT)
Dept: FAMILY MEDICINE CLINIC | Facility: CLINIC | Age: 44
End: 2024-08-16

## 2024-08-19 NOTE — TELEPHONE ENCOUNTER
Spoke to pt, notified MRI cervical spine has been authorized. She should contact central scheduling to verify or schedule appointment. Pt voiced understanding

## 2024-08-22 ENCOUNTER — HOSPITAL ENCOUNTER (OUTPATIENT)
Dept: MRI IMAGING | Facility: HOSPITAL | Age: 44
Discharge: HOME | End: 2024-08-22
Attending: FAMILY MEDICINE
Payer: COMMERCIAL

## 2024-08-22 DIAGNOSIS — M48.02 CERVICAL STENOSIS OF SPINE: ICD-10-CM

## 2024-08-22 DIAGNOSIS — M50.90 CERVICAL NECK PAIN WITH EVIDENCE OF DISC DISEASE: ICD-10-CM

## 2024-08-22 PROCEDURE — 72141 MRI NECK SPINE W/O DYE: CPT | Performed by: FAMILY MEDICINE

## 2024-08-24 DIAGNOSIS — M50.30 DEGENERATIVE DISC DISEASE, CERVICAL: Primary | ICD-10-CM

## 2024-08-24 DIAGNOSIS — M54.12 CERVICAL RADICULOPATHY: ICD-10-CM

## 2024-08-24 DIAGNOSIS — M50.20 PROTRUSION OF CERVICAL INTERVERTEBRAL DISC: ICD-10-CM

## 2025-01-24 ENCOUNTER — OFFICE VISIT (OUTPATIENT)
Dept: FAMILY MEDICINE CLINIC | Facility: CLINIC | Age: 45
End: 2025-01-24
Payer: COMMERCIAL

## 2025-01-24 VITALS
HEART RATE: 76 BPM | HEIGHT: 64 IN | TEMPERATURE: 97 F | BODY MASS INDEX: 28.82 KG/M2 | SYSTOLIC BLOOD PRESSURE: 114 MMHG | WEIGHT: 168.81 LBS | OXYGEN SATURATION: 99 % | DIASTOLIC BLOOD PRESSURE: 62 MMHG | RESPIRATION RATE: 18 BRPM

## 2025-01-24 DIAGNOSIS — Z23 NEED FOR VACCINATION: ICD-10-CM

## 2025-01-24 DIAGNOSIS — H69.92 DYSFUNCTION OF LEFT EUSTACHIAN TUBE: Primary | ICD-10-CM

## 2025-01-24 PROCEDURE — 99213 OFFICE O/P EST LOW 20 MIN: CPT | Performed by: FAMILY MEDICINE

## 2025-01-24 PROCEDURE — 3074F SYST BP LT 130 MM HG: CPT | Performed by: FAMILY MEDICINE

## 2025-01-24 PROCEDURE — 3008F BODY MASS INDEX DOCD: CPT | Performed by: FAMILY MEDICINE

## 2025-01-24 PROCEDURE — 3078F DIAST BP <80 MM HG: CPT | Performed by: FAMILY MEDICINE

## 2025-01-24 NOTE — PROGRESS NOTES
CHIEF COMPLAINT:   Chief Complaint   Patient presents with    Ear Pain     Left ear pain          HPI:     Marni [facility] is a 44 year old female presents for ear pain.    Ear pain, left:  pt reports pain in her left ear, is a little better today.  She has no cold sx of nasal congestion, cough, or sore throat.  No ear drainage.  She has a HA.  Has been taking Tylenol with little relief.  No F/C and no dizziness.                HISTORY:  Past Medical History:    Allergic rhinitis    sinus    Osteoarthritis    rheumatory    Palpitations    Patient denies medical problems    Tubal pregnancy (HCC)      Past Surgical History:   Procedure Laterality Date      2010    second childbirth    Other surgical history      cervical polypectomy : benign    Tubal ligation  2011    left tubal ligation (right salpingectomy)      Family History   Problem Relation Age of Onset    Breast Cancer Mother 61    Cancer Mother         Breast cancer diagnosis in 2018    Hypertension Mother     Hypertension Father     Hypertension Maternal Grandmother     Hypertension Maternal Grandfather     No Known Problems Paternal Grandmother     No Known Problems Paternal Grandfather       Social History:   Social History     Socioeconomic History    Marital status:    Occupational History    Occupation:    Tobacco Use    Smoking status: Never     Passive exposure: Never    Smokeless tobacco: Never   Vaping Use    Vaping status: Never Used   Substance and Sexual Activity    Alcohol use: Yes     Alcohol/week: 1.0 standard drink of alcohol     Types: 1 Standard drinks or equivalent per week     Comment: social    Drug use: No    Sexual activity: Yes     Partners: Male   Other Topics Concern    Caffeine Concern No    Exercise Yes    Seat Belt Yes    Special Diet No    Stress Concern No    Weight Concern No     Social Drivers of Health      [facility]     Social Connections    Received from [facility]    Housing Stability        Medications (Active prior to today's visit):  Current Outpatient Medications   Medication Sig Dispense Refill    Terconazole 0.8 % Vaginal Cream Place vaginally every 4 (four) hours. AT BEDTIME      Levonorgestrel (MIRENA, 52 MG,) 20 MCG/DAY Intrauterine IUD 20 mcg (1 each total) by Intrauterine route one time.      cyclobenzaprine 10 MG Oral Tab Take 1 tablet (10 mg total) by mouth nightly as needed for Muscle spasms. (Patient not taking: Reported on 1/24/2025) 30 tablet 0    naproxen 500 MG Oral Tab Take 1 tablet (500 mg total) by mouth 2 (two) times daily with meals. (Patient not taking: Reported on 1/24/2025)      fluticasone propionate 50 MCG/ACT Nasal Suspension 2 sprays by Each Nare route daily. (Patient not taking: Reported on 1/24/2025) 16 g 5    meclizine 25 MG Oral Tab Take 1 tablet (25 mg total) by mouth 3 (three) times daily as needed. (Patient not taking: Reported on 1/24/2025) 30 tablet 0       Allergies:  Allergies[1]    PSFH elements reviewed from today and agreed except as otherwise stated in HPI.  ROS:     Review of Systems   Constitutional:  Negative for appetite change, chills, fatigue and fever.   HENT:  Positive for ear pain. Negative for congestion, sinus pain and sore throat.    Respiratory:  Negative for cough.    Neurological:  Positive for headaches. Negative for dizziness.         Pertinent positives and negatives noted in the the HPI.    PHYSICAL EXAM:   /62 (BP Location: Left arm, Patient Position: Sitting, Cuff Size: adult)   Pulse 76   Temp 97.2 °F (36.2 °C) (Temporal)   Resp 18   Ht 5' 4\" (1.626 m)   Wt 168 lb 12.8 oz (76.6 kg)   LMP  (LMP Unknown)   SpO2 99%   BMI 28.97 kg/m²   Vital signs reviewed.Appears stated age, well groomed.  Physical Exam  Vitals reviewed.   Constitutional:       Appearance: Normal appearance.   HENT:      Head: Normocephalic.   Cardiovascular:       Rate and Rhythm: Normal rate and regular rhythm.      Pulses: Normal pulses.      Heart sounds: Normal heart sounds.   Pulmonary:      Effort: Pulmonary effort is normal. No respiratory distress.      Breath sounds: Normal breath sounds.   Skin:     General: Skin is warm and dry.   Neurological:      Mental Status: She is alert and oriented to person, place, and time.   Psychiatric:         Behavior: Behavior normal.          LABS     No visits with results within 2 Month(s) from this visit.   Latest known visit with results is:   Laboratory Encounter on 06/12/2024   Component Date Value    Allergen A. Alternata 06/12/2024 <0.10     Allergen Aspergillus Fum* 06/12/2024 <0.10     Allergen Bermuda Grass 06/12/2024 <0.10     Allergen [place] 06/12/2024 0.29 (H)     Allergen Basile 06/12/2024 5.25 (H)     Allergen C. Herbarum 06/12/2024 <0.10     Allergen Cat Dander 06/12/2024 0.37 (H)     Allergen Cockroach 06/12/2024 0.68 (H)     Allergen Common Pigweed 06/12/2024 <0.10     Allergen Common Ragweed 06/12/2024 0.22 (H)     Allergen [place] Tree 06/12/2024 0.15 (H)     Allergen D. Farinae 06/12/2024 4.21 (H)     Allergen D. Pteronyssinus 06/12/2024 2.60 (H)     Allergen Dog Dander 06/12/2024 0.12 (H)     Allergen Elm Tree 06/12/2024 0.45 (H)     Allergen Gabriel Elder 06/12/2024 <0.10     Allergen Mouse Epithelium 06/12/2024 <0.10     Allergen [place] 06/12/2024 0.12 (H)     Allergen Mucor Racemosus 06/12/2024 <0.10     Allergen [place] 06/12/2024 <0.10     Allergen [place] Tree 06/12/2024 <0.10     Allergen Pecan/Blanco 06/12/2024 <0.10     Allergen Penicillium Not* 06/12/2024 <0.10     Allergen Russian Thistle 06/12/2024 <0.10     Allergen [place] 06/12/2024 <0.10     Allergen Abdi Grass 06/12/2024 <0.10     Allergen [place] Tree 06/12/2024 0.20 (H)     Allergen White Yoel 06/12/2024 <0.10     Immunoglobulin E 06/12/2024 263.00 (H)       REVIEWED THIS VISIT  ASSESSMENT/PLAN:   44 year old female  with    1. Dysfunction of left eustachian tube  - suspect is due to eustachian tube dysfunction  - recommend antihistamine or decongestant  - if sx worsen or persist, advised to f-u    2. Need for vaccination    - INFLU[facility]ZA REFUSED [facility]      Meds This Visit:  Requested Prescriptions      No prescriptions requested or ordered in this encounter       Health Maintenance:  Health Maintenance   Topic Date Due    COVID-19 Vaccine (5 - 2024-25 season) 09/01/2024    Annual Physical  09/29/2024    Mammogram  10/21/2024    Influenza Vaccine (1) 06/30/2025 (Originally 10/1/2024)    Colorectal Cancer Screening  11/30/2026    Pap Smear  09/09/2027    DTaP,Tdap,and Td Vaccines (2 - Td or Tdap) 01/11/2028    Annual Depression Screening  Completed    Pneumococcal Vaccine: Birth to 50yrs  Aged Out    Meningococcal B Vaccine  Aged Out         Patient/Caregiver Education: There are no barriers to learning. Medical education done.   Outcome: Patient verbalizes understanding and agrees with plan. Advised to call or RTC if symptoms persist or worsen.    Problem List:     Patient Active Problem List   Diagnosis    Primary osteoarthritis of both hands    Dyslipidemia (high LDL; low HDL)    Vitamin D deficiency    Left cervical radiculopathy    Cervical neck pain with evidence of disc disease    Tension headache    Benign paroxysmal positional vertigo of right ear    Non-recurrent acute serous otitis media of left ear    History of endometrial biopsy 2016 neg, 2013 neg    Dyspareunia in female    Yeast infection    Recurrent vaginitis    Dense breast tissue on mammogram    Spondylosis of cervical region without myelopathy or radiculopathy    BMI 29.0-29.9,adult    Environmental allergies    Labyrinthitis of left ear    Acute non-recurrent maxillary sinusitis       Imaging & Referrals:  [facility]FLU[facility] REFUSED [facility]     1/24/2025  Trudy Cortes MD      Patient understands plan and follow-up.  Return if symptoms worsen or fail to improve,  for annual physical w/ Dr. Munoz.            [1]   Allergies  Allergen Reactions    Avocado PAIN     cramping    Gluten Flour RASH    Other PAIN     Agave syrup     Pineapple Juice PAIN    Latex OTHER (SEE COMMENTS)     Skin burning sensation

## 2025-04-23 ENCOUNTER — HOSPITAL ENCOUNTER (EMERGENCY)
Facility: HOSPITAL | Age: 45
Discharge: HOME | End: 2025-04-23
Attending: STUDENT IN AN ORGANIZED HEALTH CARE EDUCATION/TRAINING PROGRAM
Payer: COMMERCIAL

## 2025-04-23 ENCOUNTER — APPOINTMENT (OUTPATIENT)
Dept: CT IMAGING | Facility: HOSPITAL | Age: 45
End: 2025-04-23
Attending: STUDENT IN AN ORGANIZED HEALTH CARE EDUCATION/TRAINING PROGRAM
Payer: COMMERCIAL

## 2025-04-23 ENCOUNTER — APPOINTMENT (OUTPATIENT)
Dept: ULTRASOUND IMAGING | Facility: HOSPITAL | Age: 45
End: 2025-04-23
Attending: STUDENT IN AN ORGANIZED HEALTH CARE EDUCATION/TRAINING PROGRAM
Payer: COMMERCIAL

## 2025-04-23 VITALS
DIASTOLIC BLOOD PRESSURE: 74 MMHG | HEIGHT: 64 IN | TEMPERATURE: 98 F | SYSTOLIC BLOOD PRESSURE: 125 MMHG | WEIGHT: 165 LBS | RESPIRATION RATE: 16 BRPM | OXYGEN SATURATION: 98 % | HEART RATE: 58 BPM | BODY MASS INDEX: 28.17 KG/M2

## 2025-04-23 DIAGNOSIS — R10.2 PELVIC PAIN: Primary | ICD-10-CM

## 2025-04-23 LAB
ALBUMIN SERPL-MCNC: 4.7 G/DL (ref 3.2–4.8)
ALBUMIN/GLOB SERPL: 1.6 {RATIO} (ref 1–2)
ALP LIVER SERPL-CCNC: 67 U/L (ref 39–100)
ALT SERPL-CCNC: 19 U/L (ref 10–49)
ANION GAP SERPL CALC-SCNC: 8 MMOL/L (ref 0–18)
AST SERPL-CCNC: 20 U/L (ref ?–34)
B-HCG UR QL: NEGATIVE
BASOPHILS # BLD AUTO: 0.03 X10(3) UL (ref 0–0.2)
BASOPHILS NFR BLD AUTO: 0.4 %
BILIRUB SERPL-MCNC: 0.9 MG/DL (ref 0.3–1.2)
BILIRUB UR QL STRIP.AUTO: NEGATIVE
BUN BLD-MCNC: 9 MG/DL (ref 9–23)
CALCIUM BLD-MCNC: 9.9 MG/DL (ref 8.7–10.6)
CHLORIDE SERPL-SCNC: 103 MMOL/L (ref 98–112)
CLARITY UR REFRACT.AUTO: CLEAR
CO2 SERPL-SCNC: 29 MMOL/L (ref 21–32)
COLOR UR AUTO: COLORLESS
CREAT BLD-MCNC: 0.94 MG/DL (ref 0.55–1.02)
EGFRCR SERPLBLD CKD-EPI 2021: 76 ML/MIN/1.73M2 (ref 60–?)
EOSINOPHIL # BLD AUTO: 0.11 X10(3) UL (ref 0–0.7)
EOSINOPHIL NFR BLD AUTO: 1.5 %
ERYTHROCYTE [DISTWIDTH] IN BLOOD BY AUTOMATED COUNT: 11.9 %
GLOBULIN PLAS-MCNC: 3 G/DL (ref 2–3.5)
GLUCOSE BLD-MCNC: 99 MG/DL (ref 70–99)
GLUCOSE UR STRIP.AUTO-MCNC: NORMAL MG/DL
HCT VFR BLD AUTO: 39.4 % (ref 35–48)
HGB BLD-MCNC: 13.3 G/DL (ref 12–16)
IMM GRANULOCYTES # BLD AUTO: 0.02 X10(3) UL (ref 0–1)
IMM GRANULOCYTES NFR BLD: 0.3 %
KETONES UR STRIP.AUTO-MCNC: NEGATIVE MG/DL
LEUKOCYTE ESTERASE UR QL STRIP.AUTO: NEGATIVE
LIPASE SERPL-CCNC: 39 U/L (ref 12–53)
LYMPHOCYTES # BLD AUTO: 2.87 X10(3) UL (ref 1–4)
LYMPHOCYTES NFR BLD AUTO: 40.1 %
MCH RBC QN AUTO: 29.6 PG (ref 26–34)
MCHC RBC AUTO-ENTMCNC: 33.8 G/DL (ref 31–37)
MCV RBC AUTO: 87.8 FL (ref 80–100)
MONOCYTES # BLD AUTO: 0.55 X10(3) UL (ref 0.1–1)
MONOCYTES NFR BLD AUTO: 7.7 %
NEUTROPHILS # BLD AUTO: 3.58 X10 (3) UL (ref 1.5–7.7)
NEUTROPHILS # BLD AUTO: 3.58 X10(3) UL (ref 1.5–7.7)
NEUTROPHILS NFR BLD AUTO: 50 %
NITRITE UR QL STRIP.AUTO: NEGATIVE
OSMOLALITY SERPL CALC.SUM OF ELEC: 289 MOSM/KG (ref 275–295)
PH UR STRIP.AUTO: 7 [PH] (ref 5–8)
PLATELET # BLD AUTO: 225 10(3)UL (ref 150–450)
POTASSIUM SERPL-SCNC: 3.9 MMOL/L (ref 3.5–5.1)
PROT SERPL-MCNC: 7.7 G/DL (ref 5.7–8.2)
PROT UR STRIP.AUTO-MCNC: NEGATIVE MG/DL
RBC # BLD AUTO: 4.49 X10(6)UL (ref 3.8–5.3)
SODIUM SERPL-SCNC: 140 MMOL/L (ref 136–145)
SP GR UR STRIP.AUTO: 1 (ref 1–1.03)
UROBILINOGEN UR STRIP.AUTO-MCNC: NORMAL MG/DL
WBC # BLD AUTO: 7.2 X10(3) UL (ref 4–11)

## 2025-04-23 PROCEDURE — 83690 ASSAY OF LIPASE: CPT

## 2025-04-23 PROCEDURE — 85025 COMPLETE CBC W/AUTO DIFF WBC: CPT | Performed by: STUDENT IN AN ORGANIZED HEALTH CARE EDUCATION/TRAINING PROGRAM

## 2025-04-23 PROCEDURE — 83690 ASSAY OF LIPASE: CPT | Performed by: STUDENT IN AN ORGANIZED HEALTH CARE EDUCATION/TRAINING PROGRAM

## 2025-04-23 PROCEDURE — 76830 TRANSVAGINAL US NON-OB: CPT | Performed by: STUDENT IN AN ORGANIZED HEALTH CARE EDUCATION/TRAINING PROGRAM

## 2025-04-23 PROCEDURE — 80053 COMPREHEN METABOLIC PANEL: CPT

## 2025-04-23 PROCEDURE — 99285 EMERGENCY DEPT VISIT HI MDM: CPT

## 2025-04-23 PROCEDURE — 96375 TX/PRO/DX INJ NEW DRUG ADDON: CPT

## 2025-04-23 PROCEDURE — 74177 CT ABD & PELVIS W/CONTRAST: CPT | Performed by: STUDENT IN AN ORGANIZED HEALTH CARE EDUCATION/TRAINING PROGRAM

## 2025-04-23 PROCEDURE — 96361 HYDRATE IV INFUSION ADD-ON: CPT

## 2025-04-23 PROCEDURE — 93975 VASCULAR STUDY: CPT | Performed by: STUDENT IN AN ORGANIZED HEALTH CARE EDUCATION/TRAINING PROGRAM

## 2025-04-23 PROCEDURE — 81001 URINALYSIS AUTO W/SCOPE: CPT

## 2025-04-23 PROCEDURE — 76856 US EXAM PELVIC COMPLETE: CPT | Performed by: STUDENT IN AN ORGANIZED HEALTH CARE EDUCATION/TRAINING PROGRAM

## 2025-04-23 PROCEDURE — 80053 COMPREHEN METABOLIC PANEL: CPT | Performed by: STUDENT IN AN ORGANIZED HEALTH CARE EDUCATION/TRAINING PROGRAM

## 2025-04-23 PROCEDURE — 81001 URINALYSIS AUTO W/SCOPE: CPT | Performed by: STUDENT IN AN ORGANIZED HEALTH CARE EDUCATION/TRAINING PROGRAM

## 2025-04-23 PROCEDURE — 85025 COMPLETE CBC W/AUTO DIFF WBC: CPT

## 2025-04-23 PROCEDURE — 96374 THER/PROPH/DIAG INJ IV PUSH: CPT

## 2025-04-23 PROCEDURE — 81025 URINE PREGNANCY TEST: CPT

## 2025-04-23 RX ORDER — HYDROCODONE BITARTRATE AND ACETAMINOPHEN 5; 325 MG/1; MG/1
1-2 TABLET ORAL EVERY 6 HOURS PRN
Qty: 10 TABLET | Refills: 0 | Status: SHIPPED | OUTPATIENT
Start: 2025-04-23 | End: 2025-04-28

## 2025-04-23 RX ORDER — KETOROLAC TROMETHAMINE 15 MG/ML
15 INJECTION, SOLUTION INTRAMUSCULAR; INTRAVENOUS ONCE
Status: COMPLETED | OUTPATIENT
Start: 2025-04-23 | End: 2025-04-23

## 2025-04-23 RX ORDER — IBUPROFEN 600 MG/1
600 TABLET, FILM COATED ORAL EVERY 8 HOURS PRN
Qty: 30 TABLET | Refills: 0 | Status: SHIPPED | OUTPATIENT
Start: 2025-04-23 | End: 2025-04-30

## 2025-04-23 RX ORDER — MORPHINE SULFATE 4 MG/ML
4 INJECTION, SOLUTION INTRAMUSCULAR; INTRAVENOUS ONCE
Status: COMPLETED | OUTPATIENT
Start: 2025-04-23 | End: 2025-04-23

## 2025-04-23 RX ORDER — ONDANSETRON 2 MG/ML
4 INJECTION INTRAMUSCULAR; INTRAVENOUS ONCE
Status: COMPLETED | OUTPATIENT
Start: 2025-04-23 | End: 2025-04-23

## 2025-04-23 RX ORDER — MORPHINE SULFATE 4 MG/ML
4 INJECTION, SOLUTION INTRAMUSCULAR; INTRAVENOUS ONCE
Status: DISCONTINUED | OUTPATIENT
Start: 2025-04-23 | End: 2025-04-23

## 2025-04-23 NOTE — ED INITIAL ASSESSMENT (HPI)
Pt in from home. Pt has had RLQ abdominal cramping constantly since last night. Pt denies nausea/vomiting/diarrhea/fever. Pt having chills accompanying her symptoms. Pt denies hx of abdominal surgery.

## 2025-04-23 NOTE — ED PROVIDER NOTES
History     Chief Complaint   Patient presents with    Abdomen/Flank Pain       HPI    44 year old female here with right lower quadrant/right pelvic discomfort mostly constant since last night.  No associated fevers or vomiting or diarrhea or urinary symptoms.  She has a Mirena so does not have typical menstruations.  Oral intake earlier this morning did not exacerbate her symptoms.          Past Medical History[1]    Past Surgical History[2]    Social History     Socioeconomic History    Marital status:    Occupational History    Occupation:    Tobacco Use    Smoking status: Never     Passive exposure: Never    Smokeless tobacco: Never   Vaping Use    Vaping status: Never Used   Substance and Sexual Activity    Alcohol use: Yes     Alcohol/week: 1.0 standard drink of alcohol     Types: 1 Standard drinks or equivalent per week     Comment: social    Drug use: No    Sexual activity: Yes     Partners: Male   Other Topics Concern    Caffeine Concern No    Exercise Yes    Seat Belt Yes    Special Diet No    Stress Concern No    Weight Concern No     Social Drivers of Health      Received from [facility]    Housing Stability                   Physical Exam     ED Triage Vitals [04/23/25 1358]   /84   Pulse 87   Resp 14   Temp 98.2 °F (36.8 °C)   Temp src Temporal   SpO2 97 %   O2 Device None (Room air)       Physical Exam  Constitutional:       General: She is not in acute distress.  Eyes:      Extraocular Movements: Extraocular movements intact.   Cardiovascular:      Rate and Rhythm: Normal rate.      Pulses: Normal pulses.   Pulmonary:      Effort: Pulmonary effort is normal. No respiratory distress.   Abdominal:      General: Abdomen is flat.      Palpations: Abdomen is soft.      Comments: Mild tenderness in the right lower quadrant/right pelvic region   Musculoskeletal:      Cervical back: Normal range of motion.   Neurological:      General: No focal deficit  present.      Mental Status: She is alert.              ED Course     Labs Reviewed   URINALYSIS, ROUTINE - Abnormal; Notable for the following components:       Result Value    Urine Color Colorless (*)     Blood Urine 1+ (*)     Bacteria Urine Rare (*)     Squamous Epi. Cells Few (*)     All other components within normal limits   COMP METABOLIC PANEL (14) - Normal   LIPASE - Normal   POCT PREGNANCY URINE - Normal   CBC WITH DIFFERENTIAL WITH PLATELET   RAINBOW DRAW LAVENDER   RAINBOW DRAW LIGHT GREEN     US PELVIS EV W DOPPLER (CPT=76856/97289/38781)  Result Date: 4/23/2025  CONCLUSION:  A simple cyst versus follicle is noted of the left ovary.  Normal arterial and venous waveforms are noted.  A trace amount of free pelvic fluid is present which is nonspecific and could be physiologic in a premenopausal female.  Consider continued follow-up.   LOCATION:  [facility]     Dictated by (CST): Cain Brandt MD on 4/23/2025 at 10:22 PM     Finalized by (CST): Cain Brandt MD on 4/23/2025 at 10:25 PM       CT ABDOMEN+PELVIS(CONTRAST ONLY)(CPT=74177)  Result Date: 4/23/2025  CONCLUSION:  There is a normal-appearing appendix.  No free fluid is seen.  Degenerative changes are present in the region of the sacroiliac joints bilaterally.  Correlation for symptoms in this region is suggested.   LOCATION:  [facility]   Dictated by (CST): Cain Brandt MD on 4/23/2025 at 7:56 PM     Finalized by (CST): Cain Brandt MD on 4/23/2025 at 7:59 PM             MDM     Vitals:    04/23/25 1358 04/23/25 1730 04/23/25 1957 04/23/25 2147   BP: 129/84 119/79 137/80 125/74   Pulse: 87 77 74 58   Resp: 14 15 18 16   Temp: 98.2 °F (36.8 °C)      TempSrc: Temporal      SpO2: 97% 100% 100% 98%   Weight: 74.8 kg      Height: 162.6 cm (5' 4\")          Colitis, appendicitis, ovarian cystic/less likely torsion on differential.  Abdomen without peritonitis, vitals reassuring.    ED Course as of 04/23/25    ------------------------------------------------------------  Time: 1627  Comment: Labs with reassuring renal and liver function. Lipase WNL. No leukocytosis.    ------------------------------------------------------------  Time: 1939  Comment: My interpretation of CT without large fluid collections or free air  ------------------------------------------------------------  Time: 2006  Comment: CT negative for acute pathology.  Patient is still having discomfort.  Will proceed with ultrasound for further assessment  ------------------------------------------------------------  Time: 2231  Comment: Ultrasound without evidence of torsion. Pt remains hds and comfortable. Discussed incidental findings with patient, free fluid may be irritating pelvic region.  Discussed mild constipation on CT and advised MiraLAX.  Prescription for pain medicine considering.  We discussed return precautions with any worsening pain, fevers, vomiting.         Disposition and Plan     Clinical Impression:  1. Pelvic pain        Disposition:  Discharge    Follow-up:  Sarah Munoz DO  [address] [facility] [id number] [phone]    Follow up        Medications Prescribed:  Current Discharge Medication List        START taking these medications    Details   HYDROcodone-acetaminophen 5-325 MG Oral Tab Take 1-2 tablets by mouth every 6 (six) hours as needed for Pain.  Qty: 10 tablet, Refills: 0    Associated Diagnoses: Pelvic pain      ibuprofen 600 MG Oral Tab Take 1 tablet (600 mg total) by mouth every 8 (eight) hours as needed for Pain or Fever.  Qty: 30 tablet, Refills: 0                      [1]   Past Medical History:   Allergic rhinitis    sinus    Osteoarthritis    rheumatory    Palpitations    Patient denies medical problems    Tubal pregnancy (HCC)   [2]   Past Surgical History:  Procedure Laterality Date          second childbirth    Other surgical history      cervical polypectomy :  benign    Tubal ligation  2011    left tubal ligation (right salpingectomy)

## 2025-04-24 ENCOUNTER — TELEPHONE (OUTPATIENT)
Dept: FAMILY MEDICINE CLINIC | Facility: CLINIC | Age: 45
End: 2025-04-24

## 2025-04-24 NOTE — TELEPHONE ENCOUNTER
ER visit 04/23/25 for Pelvic Pain  Lab work, CT, and US were completed and reviewed with pt.    Pt. would like to discuss results and further treatment options with Dr. Munoz. Pt. scheduled appt. for 05/01/25

## 2025-04-24 NOTE — TELEPHONE ENCOUNTER
Patient went to the ER for severe cramping on rt side had a CT, Ultrasound and blood work and she would like to know what the results are and her next steps.  Offered her visit and she wanted to wait to see what doctor said.

## 2025-04-28 RX ORDER — METRONIDAZOLE 7.5 MG/G
GEL VAGINAL
COMMUNITY
Start: 2025-04-10 | End: 2025-04-29 | Stop reason: ALTCHOICE

## 2025-04-29 ENCOUNTER — OFFICE VISIT (OUTPATIENT)
Dept: FAMILY MEDICINE CLINIC | Facility: CLINIC | Age: 45
End: 2025-04-29
Payer: COMMERCIAL

## 2025-04-29 VITALS
BODY MASS INDEX: 28.65 KG/M2 | SYSTOLIC BLOOD PRESSURE: 126 MMHG | HEART RATE: 78 BPM | TEMPERATURE: 97 F | OXYGEN SATURATION: 99 % | HEIGHT: 64 IN | WEIGHT: 167.81 LBS | RESPIRATION RATE: 19 BRPM | DIASTOLIC BLOOD PRESSURE: 80 MMHG

## 2025-04-29 DIAGNOSIS — R14.1 ABDOMINAL GAS PAIN: ICD-10-CM

## 2025-04-29 DIAGNOSIS — R14.0 ABDOMINAL BLOATING: ICD-10-CM

## 2025-04-29 DIAGNOSIS — Z87.898 HISTORY OF ABDOMINAL PAIN: Primary | ICD-10-CM

## 2025-04-29 DIAGNOSIS — N83.202 CYST OF LEFT OVARY: ICD-10-CM

## 2025-04-29 PROCEDURE — G2211 COMPLEX E/M VISIT ADD ON: HCPCS | Performed by: FAMILY MEDICINE

## 2025-04-29 PROCEDURE — 3074F SYST BP LT 130 MM HG: CPT | Performed by: FAMILY MEDICINE

## 2025-04-29 PROCEDURE — 3079F DIAST BP 80-89 MM HG: CPT | Performed by: FAMILY MEDICINE

## 2025-04-29 PROCEDURE — 3008F BODY MASS INDEX DOCD: CPT | Performed by: FAMILY MEDICINE

## 2025-04-29 PROCEDURE — 99213 OFFICE O/P EST LOW 20 MIN: CPT | Performed by: FAMILY MEDICINE

## 2025-04-29 NOTE — PROGRESS NOTES
CHIEF COMPLAINT:   Chief Complaint   Patient presents with    Follow - Up     [facility] Emergency Department DOS 4/23/2025           HPI:     Marni [facility] is a 44 year old female presents for follow up RLQ abdominal pain started 4/22/2025    History of Present Illness  Marni [facility] is a 44 year old female who presents with right lower quadrant abdominal pain.    She has been experiencing severe right lower quadrant abdominal pain since April 22nd, described as similar to labor contractions, occurring every minute and persisting throughout the night, which prevented her from sleeping. The pain was accompanied by chills and episodes of feeling faint. No fever, vomiting, diarrhea, or blood in the stool.    The pain initially improved slightly in the early morning hours but worsened again, prompting her to visit the ER. In the ER, she was administered morphine and ibuprofen, which did not fully alleviate the pain. The pain persisted until about 11 PM the following night. She continues to experience dull aches and richard pain after eating, typically one to two hours post-meal.    A CT scan of abdomen and pelvis with contrast and various lab tests conducted in the ER revealed no elevated white blood cell count, normal liver and kidney function, and no signs of appendicitis or other acute abdominal issues. A simple cyst was noted on the left ovary. She was advised to take Miralax for constipation, which she used for two days before stopping.    She suspects that her symptoms may be related to dietary choices, including consuming a large amount of green beans and sweets, which may have caused intestinal discomfort. She reports ongoing gas and bloating, which disrupts her sleep, and has been eating minimally due to discomfort and fear of exacerbating her symptoms.    No nausea or vomiting. She maintains regular, albeit minimal, bowel movements. She has been trying to eat more vegetables and has  recently stopped intermittent fasting to avoid prolonged periods of an empty stomach. She drinks 10 to 12 ounces of water daily.          HISTORY:  Past Medical History[1]   Past Surgical History[2]   Family History[3]   Social History: Short Social Hx on File[4]     Medications (Active prior to today's visit):  Current Medications[5]    Allergies:  Allergies[6]    PSFH elements reviewed from today and agreed except as otherwise stated in HPI.  PHYSICAL EXAM:   /80   Pulse 78   Temp 97.3 °F (36.3 °C) (Temporal)   Resp 19   Ht 5' 4\" (1.626 m)   Wt 167 lb 12.8 oz (76.1 kg)   LMP  (LMP Unknown)   SpO2 99%   BMI 28.80 kg/m²   Vital signs reviewed.Appears stated age, well groomed.  Physical Exam   General: Well-nourished, well hydrated. No acute distress. No pallor. No tachypnea. Non toxic.  HEENT: normocephaly, atraumatic.  Sclera clear and non icteric bilaterally.  Oral pharynx clear without normal finding.  Moist mucous membranes.  Neck: supple. No adenopathy. No thyromegaly.   Heart: RRR without S3 or S4 murmur . Clear S1S2  Lungs: clear to auscultation bilaterally. No rales, rhonchi or wheezes. Good inspiratory and expiratory effort  Abdomen: soft, nontender, nondistended. NL bowel sounds.   Extremities: No cyanosis, clubbing, or edema bilaterally.   Skin: no acute rashes  Neuro: AOx3.  Normal gait      LABS     Admission on 04/23/2025, Discharged on 04/23/2025   Component Date Value    Glucose 04/23/2025 99     Sodium 04/23/2025 140     Potassium 04/23/2025 3.9     Chloride 04/23/2025 103     CO2 04/23/2025 29.0     Anion Gap 04/23/2025 8     BUN 04/23/2025 9     Creatinine 04/23/2025 0.94     Calcium, Total 04/23/2025 9.9     Calculated Osmolality 04/23/2025 289     eGFR-Cr 04/23/2025 76     AST 04/23/2025 20     ALT 04/23/2025 19     Alkaline Phosphatase 04/23/2025 67     Bilirubin, Total 04/23/2025 0.9     Total Protein 04/23/2025 7.7     Albumin 04/23/2025 4.7     Globulin  04/23/2025 3.0     A/G  Ratio 04/23/2025 1.6     WBC 04/23/2025 7.2     RBC 04/23/2025 4.49     HGB 04/23/2025 13.3     HCT 04/23/2025 39.4     PLT 04/23/2025 225.0     MCV 04/23/2025 87.8     MCH 04/23/2025 29.6     MCHC 04/23/2025 33.8     RDW 04/23/2025 11.9     Neutrophil Absolute Prel* 04/23/2025 3.58     Neutrophil Absolute 04/23/2025 3.58     Lymphocyte Absolute 04/23/2025 2.87     Monocyte Absolute 04/23/2025 0.55     Eosinophil Absolute 04/23/2025 0.11     Basophil Absolute 04/23/2025 0.03     Immature Granulocyte Abs* 04/23/2025 0.02     Neutrophil % 04/23/2025 50.0     Lymphocyte % 04/23/2025 40.1     Monocyte % 04/23/2025 7.7     Eosinophil % 04/23/2025 1.5     Basophil % 04/23/2025 0.4     Immature Granulocyte % 04/23/2025 0.3     Lipase 04/23/2025 39     Hold Lavender 04/23/2025 Auto Resulted     Hold Lt Green 04/23/2025 Auto Resulted     Urine Color 04/23/2025 Colorless (A)     Clarity Urine 04/23/2025 Clear     Spec Gravity 04/23/2025 1.005     Glucose Urine 04/23/2025 Normal     Bilirubin Urine 04/23/2025 Negative     Ketones Urine 04/23/2025 Negative     Blood Urine 04/23/2025 1+ (A)     pH Urine 04/23/2025 7.0     Protein Urine 04/23/2025 Negative     Urobilinogen Urine 04/23/2025 Normal     Nitrite Urine 04/23/2025 Negative     Leukocyte Esterase Urine 04/23/2025 Negative     WBC Urine 04/23/2025 1-5     RBC Urine 04/23/2025 0-2     Bacteria Urine 04/23/2025 Rare (A)     Squamous Epi. Cells 04/23/2025 Few (A)     Renal Tubular Epithelial* 04/23/2025 None Seen     Transitional Cells 04/23/2025 None Seen     Yeast Urine 04/23/2025 None Seen     POCT Urine Pregnancy 04/23/2025 Negative     US PELVIS EV W DOPPLER (CPT=76856/02878/50298)  Result Date: 4/23/2025  PROCEDURE:  US PELVIS EV W DOPPLER (CPT=76856/40316/92603)  COMPARISON:  US [facility], US PELVIS EV (TRNS ABD AND ENDOVAG) (CPT=76856,14724), 12/16/2015, 10:16 AM.  INDICATIONS:  R pelvic pain eval torsion  TECHNIQUE:  Ultrasound of the pelvis was performed with a  transvaginal and transabdominal probe.  Doppler evaluation of the ovaries was performed of the ovarian arteries and veins.  B-mode images, Doppler color flow, and spectral waveform analysis were performed.  Transvaginal ultrasound was used to better evaluate adnexal and endometrial detail.  PATIENT STATED HISTORY: (As transcribed by Technologist)     MEASUREMENTS:        Uterus Length:                      6.70 cm x 3.42 cm x 3.59 cm        Endometrium Thickness:      1.05 cm Right Ovary:                         1.87 cm x 1.44 cm x 1.36 cm Left Ovary:                           1.87 cm x 1.67 cm x 1.37 cm  FINDINGS:  PELVIS  UTERUS:  There is an IUD present within the endometrial canal.  Otherwise unremarkable appearance. RIGHT OVARY:  The right ovary appears normal in size, shape, and echogenicity.  No significant masses are identified. LEFT OVARY:  Simple cyst versus follicle left ovary measures up to 1.1 cm.  CUL-DE-SAC:  A trace amount of free pelvic fluid is likely present.  DOPPLER WAVE FORMS FLOW:  Normal arterial and venous waveforms are noted of the ovaries bilaterally. OTHER:  Negative.            CONCLUSION:  A simple cyst versus follicle is noted of the left ovary.  Normal arterial and venous waveforms are noted.  A trace amount of free pelvic fluid is present which is nonspecific and could be physiologic in a premenopausal female.  Consider continued follow-up.   LOCATION:  [facility]     Dictated by (CST): Cain Brandt MD on 4/23/2025 at 10:22 PM     Finalized by (CST): Cain Brandt MD on 4/23/2025 at 10:25 PM       CT ABDOMEN+PELVIS(CONTRAST ONLY)(CPT=74177)  Result Date: 4/23/2025  PROCEDURE:  CT ABDOMEN+PELVIS (CONTRAST ONLY) (CPT=74177)  COMPARISON:  None.  INDICATIONS:  lower right abd pain since last night eval appy  TECHNIQUE:  CT scanning was performed from the dome of the diaphragm to the pubic symphysis with non-ionic intravenous contrast material. Post contrast coronal MPR imaging was performed.   Dose reduction techniques were used. Dose information is transmitted to the ACR (American College of Radiology) NRDR (National Radiology Data Registry) which includes the Dose Index Registry.  PATIENT STATED HISTORY:(As transcribed by Technologist)  RLQ pain   CONTRAST USED:  80cc of Isovue 370  FINDINGS:  LIVER:  No enlargement, atrophy, abnormal density, or significant focal lesion.  BILIARY:  Mild hyperdensity within the gallbladder may represent sludge. PANCREAS:  No lesion, fluid collection, ductal dilatation, or atrophy.  SPLEEN:  No enlargement or focal lesion.  KIDNEYS:  2.1 x 1.5 cm left renal cyst has an overall simple appearance.  No specific further follow-up is recommended. ADRENALS:  No mass or enlargement.  AORTA/VASCULAR:  No aneurysm or dissection.  RETROPERITONEUM:  No mass or adenopathy.  BOWEL/MESENTERY:  There is a normal-appearing appendix.  No free fluid is seen.  Lack of oral contrast somewhat limits assessment of the bowel.  A moderate amount of fecal material is present within the colon. ABDOMINAL WALL:  No mass or hernia.  URINARY BLADDER:  No visible focal wall thickening, lesion, or calculus.  PELVIC NODES:  No adenopathy.  PELVIC ORGANS:  There is an IUD present within the uterus.  Please note that uterus and ovaries may not be well assessed with CT. BONES:  Degenerative changes are noted at the sacroiliac joints with subchondral sclerosis. LUNG BASES:  No visible pulmonary or pleural disease.  OTHER:  Negative.             CONCLUSION:  There is a normal-appearing appendix.  No free fluid is seen.  Degenerative changes are present in the region of the sacroiliac joints bilaterally.  Correlation for symptoms in this region is suggested.   LOCATION:  [facility]   Dictated by (CST): Cain Brandt MD on 4/23/2025 at 7:56 PM     Finalized by (CST): Cain Brandt MD on 4/23/2025 at 7:59 PM         REVIEWED THIS VISIT  ASSESSMENT/PLAN:   44 year old female with    1. History of abdominal pain  2.  Abdominal bloating  3. Abdominal gas pain  - Gastro Referral - In Network    4. Cyst of left ovary    Assessment & Plan  History of Abdominal pain  Right lower quadrant abdominal pain with severe contractions, chills, and dizziness lasting approximately 24 hours. No fever, vomiting, or diarrhea. Labs and imaging, including CT abdomen pelvis, were normal. Differential includes intestinal spasm due to dietary intake (green beans, sweets, coffee) and constipation. Moderate fecal matter noted on imaging. Pain improved with bowel movements. No evidence of appendicitis or diverticulitis. Symptoms may be related to dietary choices and constipation.  Abdominal pain has completely resolved and not reoccurred.  She also has had some abdominal bloating and gas pain that has persisted.  - Encourage return to regular diet and adequate hydration.  - Discontinue Miralax and monitor bowel movements.  - Consider referral to gastroenterology if symptoms persist i.e. abdominal bloating and gas or abdominal pain  - Advise to return to ER if symptoms worsen with vomiting, fever, severe pain or other concerning symptoms.    Simple ovarian cyst  Left ovarian simple cyst measuring 1.1 cm noted on imaging. Not causing current pain as it is on the opposite side of reported pain. Likely benign and not related to current symptoms.  - Repeat ultrasound in 8-12 weeks to monitor cyst resolution.    Degenerative changes in spine  Degenerative changes in spine on imaging, not correlating with current pain location.        Meds This Visit:  Requested Prescriptions      No prescriptions requested or ordered in this encounter       Health Maintenance:  Health Maintenance   Topic Date Due    COVID-19 Vaccine (5 - 2024-25 season) 09/01/2024    Annual Physical  09/29/2024    Mammogram  10/21/2024    Influenza Vaccine (Season Ended) 10/01/2025    Colorectal Cancer Screening  11/30/2026    Pap Smear  09/09/2027    DTaP,Tdap,and Td Vaccines (2 - Td or Tdap)  2028    Annual Depression Screening  Completed    Pneumococcal Vaccine: Birth to 50yrs  Aged Out    Meningococcal B Vaccine  Aged Out         Patient/Caregiver Education: Patient/Caregiver Education: There are no barriers to learning. Medical education done.   Outcome: Patient verbalizes understanding. Patient is notified to call with any questions, comp lications, allergies, or worsening or changing symptoms.  Patient is to call with any side effects or complications from the treatments as a result of today.     Problem List:   Problem List[7]    Imaging & Referrals:  None     2025  Sarah Munoz, DO      Patient understands plan and follow-up.  No follow-ups on file.            [1]   Past Medical History:   Allergic rhinitis    sinus    Osteoarthritis    rheumatory    Palpitations    Patient denies medical problems    Tubal pregnancy (HCC)   [2]   Past Surgical History:  Procedure Laterality Date          second childbirth    Other surgical history      cervical polypectomy : benign    Tubal ligation      left tubal ligation (right salpingectomy)   [3]   Family History  Problem Relation Age of Onset    Breast Cancer Mother 61    Cancer Mother         Breast cancer diagnosis in 2018    Hypertension Mother     Hypertension Father     Hypertension Maternal Grandmother     Hypertension Maternal Grandfather     No Known Problems Paternal Grandmother     No Known Problems Paternal Grandfather    [4]   Social History  Socioeconomic History    Marital status:    Occupational History    Occupation:    Tobacco Use    Smoking status: Never     Passive exposure: Never    Smokeless tobacco: Never   Vaping Use    Vaping status: Never Used   Substance and Sexual Activity    Alcohol use: Yes     Alcohol/week: 1.0 standard drink of alcohol     Types: 1 Standard drinks or equivalent per week     Comment: social    Drug use: No    Sexual activity: Yes     Partners: Male   Other Topics  Concern    Caffeine Concern No    Exercise Yes    Seat Belt Yes    Special Diet No    Stress Concern No    Weight Concern No     Social Drivers of Health      Received from [facility]    Housing Stability   [5]   Current Outpatient Medications   Medication Sig Dispense Refill    Terconazole 0.8 % Vaginal Cream Place vaginally every 4 (four) hours. AT BEDTIME      Levonorgestrel (MIRENA, 52 MG,) 20 MCG/DAY Intrauterine IUD 20 mcg (1 each total) by Intrauterine route one time.     [6]   Allergies  Allergen Reactions    Avocado PAIN     cramping    Gluten Flour RASH    Other PAIN     Agave syrup     Pineapple Juice PAIN    Latex OTHER (SEE COMMENTS)     Skin burning sensation    [7]   Patient Active Problem List  Diagnosis    Primary osteoarthritis of both hands    Dyslipidemia (high LDL; low HDL)    Vitamin D deficiency    Left cervical radiculopathy    Cervical neck pain with evidence of disc disease    Tension headache    Benign paroxysmal positional vertigo of right ear    Non-recurrent acute serous otitis media of left ear    History of endometrial biopsy 2016 neg, 2013 neg    Dyspareunia in female    Yeast infection    Recurrent vaginitis    Dense breast tissue on mammogram    Spondylosis of cervical region without myelopathy or radiculopathy    BMI 29.0-29.9,adult    Environmental allergies    Labyrinthitis of left ear    Acute non-recurrent maxillary sinusitis

## 2025-05-07 PROBLEM — R14.0 ABDOMINAL BLOATING: Status: ACTIVE | Noted: 2025-05-07

## 2025-05-07 PROBLEM — Z87.898 HISTORY OF ABDOMINAL PAIN: Status: ACTIVE | Noted: 2025-05-07

## 2025-05-07 PROBLEM — N83.202 CYST OF LEFT OVARY: Status: ACTIVE | Noted: 2025-05-07

## 2025-05-07 PROBLEM — R14.1 ABDOMINAL GAS PAIN: Status: ACTIVE | Noted: 2025-05-07

## 2025-05-09 ENCOUNTER — HOSPITAL ENCOUNTER (OUTPATIENT)
Dept: MAMMOGRAPHY | Facility: HOSPITAL | Age: 45
Discharge: HOME | End: 2025-05-09
Attending: OBSTETRICS & GYNECOLOGY
Payer: COMMERCIAL

## 2025-05-09 DIAGNOSIS — N63.24 BREAST LUMP ON LEFT SIDE AT 7 O'CLOCK POSITION: ICD-10-CM

## 2025-05-09 PROCEDURE — 77061 BREAST TOMOSYNTHESIS UNI: CPT | Performed by: OBSTETRICS & GYNECOLOGY

## 2025-05-09 PROCEDURE — 77065 DX MAMMO INCL CAD UNI: CPT | Performed by: OBSTETRICS & GYNECOLOGY

## 2025-05-09 PROCEDURE — 76642 ULTRASOUND BREAST LIMITED: CPT | Performed by: OBSTETRICS & GYNECOLOGY

## (undated) DIAGNOSIS — R11.0 NAUSEA: Primary | ICD-10-CM

## (undated) NOTE — LETTER
12/10/20        Marni M[address]onald [address]  [facility] [id number]      Dear Gilma Oswald records indicate that you have outstanding lab work and or testing that was ordered for you and has not yet been completed:        CBC With Diff      C

## (undated) NOTE — LETTER
Date: 11/13/2017    Patient Name: Myesha Elizondo          To Whom it may concern: The above patient was seen at the [facility] for treatment of a medical condition. This patient should be excused from attending work until 11/15/2017

## (undated) NOTE — LETTER
11/19/18        Marni [facility]  [address] [facility] [place] [address]      Dear Dottie Spring records indicate that you have outstanding lab work and or testing that was ordered for you and has not yet been completed:  Orders Placed This Encou

## (undated) NOTE — LETTER
Patient Name: Sulma Roe  YOB: 1980          MRN number:  [id number]  Date:  8/3/2023  Referring Physician:  Karena Souza    Discharge Summary    Dear Dr. Mikal Ortiz,    Progress Summary  Pt has attended 8 visits in Physical Therapy. Subjective:  Has had no dizziness but then dizziness came back last week, lasting 2-3 days. Noted L ear was painful 4-5 days and then the dizziness started. Today no dizziness again. Plans to follow up with ENT to discuss. Does have hx of sinus issues. Better overall, still some c/o intermittent neck stiffness but not the severe neck issues. Balance improvement, has not had issues. Pain:   4-5/10 \"better\"   eval: Current 6/10, Best 2/10, Worst 7/10     Objective: Increased flex CT junction   Strength: Deep neck flexor 10 sec                 Myotomes strong/equal bilat UE's  Cervical AROM: no dizziness all directions                             WFL's                            Min restriction with L rot 60 deg L sided stiff  VOR head thrust:  R (-)  L (-)     Positional Testing:   [facility]: R (-), L (-)   [facility]): (-)      [facility]): 36 mod handicap        Assessment: Pt presented with cervicogenic dizziness. Good progress in PT with Cervical pain overall decreasing. Dizziness had resolved until last week with flare up for 2-3 days, correlated with L ear pain. Pt has benefited from cervical exercise program and posture awareness principles. She is now ready for d/c with independent HEP. Pt to follow up with PCP and possible ENT. Goals:   (to be met in 8 visits)  . Independent with HEP including safe return to gym program. --> met  bilat cervical rot at least 70 deg without c/o pain or dizziness.    --> in progress  Able to bend to floor for household chores without symptoms of dizziness   --> met  (I) stand eyes closed with narrow base of support and head movement for shower safety. -> met  Able to tolerate grocery shopping/errands 30 mins without aggravation of dizziness -> met  Pt to report ability to turn over in bed, transfer without dizziness. -> met      Plan: Pt has completed 8/8 visits. D/c to HEP. Cervical pain overall much improved but not resolved; pt to follow up with PCP as needed. Patient/Family/Caregiver was advised of these findings, precautions, and treatment options and has agreed to actively participate in planning and for this course of care. Thank you for your referral. If you have any questions, please contact me at Dept: [phone]. Sincerely,  Electronically signed by therapist: Arnoldo Wadsworth PT     Physician's certification required:  No        21st Century Cures Act Notice to Patient: Medical documents like this are made available to patients in the interest of transparency. However, be advised this is a medical document and it is intended as [id number]-[id number] communication between your medical providers. This medical document may contain abbreviations, assessments, medical data, and results or other terms that are unfamiliar. Medical documents are intended to carry relevant information, facts as evident, and the clinical opinion of the practitioner. As such, this medical document may be written in language that appears blunt or direct. You are encouraged to contact your medical provider and/or [facility] Patient Experience if you have any questions about this medical document.